# Patient Record
Sex: FEMALE
[De-identification: names, ages, dates, MRNs, and addresses within clinical notes are randomized per-mention and may not be internally consistent; named-entity substitution may affect disease eponyms.]

---

## 2021-01-17 ENCOUNTER — NURSE TRIAGE (OUTPATIENT)
Dept: OTHER | Facility: CLINIC | Age: 71
End: 2021-01-17

## 2021-01-17 NOTE — TELEPHONE ENCOUNTER
Brief description of triage: Pt calls in reporting that she had chest pains on Friday that came and went for a few seconds x several hours. This pain has not re-occurred. Pt reports she has a history of anxiety. See below assessment. Triage indicates for patient to see PCP within 24 hours and pt agreeable with plan. Pt inquires if it is okay to take her PRN Xanax. Pt advised to take Xanax as rx, for acute anxiety, and pt agreeable. Care advice provided, patient verbalizes understanding; denies any other questions or concerns; instructed to call back for any new or worsening symptoms. This triage is a result of a call to [address] [place] [address]. Please do not respond to the triage nurse through this encounter. Any subsequent communication should be directly with the patient. Reason for Disposition   [1] Chest pain lasting < 5 minutes AND [2] NO chest pain or cardiac symptoms (e.g., breathing difficulty, sweating) now    Answer Assessment - Initial Assessment Questions  1. LOCATION: \"Where does it hurt? \"        \"behind\" left breast     2. RADIATION: \"Does the pain go anywhere else? \" (e.g., into neck, jaw, arms, back)      Denies    3. ONSET: \"When did the chest pain begin? \" (Minutes, hours or days)       Pt has these fleeting chest pains on Friday that lasted a few hours and has not returned    4. PATTERN \"Does the pain come and go, or has it been constant since it started? \"  \"Does it get worse with exertion? \"       Came and went    5. DURATION: \"How long does it last\" (e.g., seconds, minutes, hours)      A second or so     6. SEVERITY: \"How bad is the pain? \"  (e.g., Scale 1-10; mild, moderate, or severe)     - MILD (1-3): doesn't interfere with normal activities      - MODERATE (4-7): interferes with normal activities or awakens from sleep     - SEVERE (8-10): excruciating pain, unable to do any normal activities        Mild    7.  CARDIAC RISK FACTORS: \"Do you have any history of heart problems or risk factors for heart disease? \" (e.g., angina, prior heart attack; diabetes, high blood pressure, high cholesterol, smoker, or strong family history of heart disease)      Denies - pt had negative heart cath 6-7 years ago. Pt has controlled HTN. 8. PULMONARY RISK FACTORS: \"Do you have any history of lung disease? \"  (e.g., blood clots in lung, asthma, emphysema, birth control pills)      Denies    9. CAUSE: \"What do you think is causing the chest pain? \"      Anxiety    10. OTHER SYMPTOMS: \"Do you have any other symptoms? \" (e.g., dizziness, nausea, vomiting, sweating, fever, difficulty breathing, cough)        Denies all listed    11. PREGNANCY: \"Is there any chance you are pregnant? \" \"When was your last menstrual period? \"        Not asked    Pt reports that she has been dx with anxiety in the past and has tried two different anti-anxiety medications but they had side effects. Pt has PRN Xanax for acute anxiety and pt has only taken for flying.     Protocols used: CHEST PAIN-ADULT-AH

## 2023-02-08 PROBLEM — M79.10 MYALGIA: Status: ACTIVE | Noted: 2023-02-08

## 2023-02-08 PROBLEM — R39.9 UTI SYMPTOMS: Status: ACTIVE | Noted: 2023-02-08

## 2023-02-08 PROBLEM — J30.2 SEASONAL ALLERGIES: Status: ACTIVE | Noted: 2023-02-08

## 2023-02-08 PROBLEM — F41.9 ANXIETY: Status: ACTIVE | Noted: 2023-02-08

## 2023-02-08 PROBLEM — E66.811 OBESITY (BMI 30.0-34.9): Status: ACTIVE | Noted: 2023-02-08

## 2023-02-08 PROBLEM — Z93.2 ILEOSTOMY PRESENT (MULTI): Status: ACTIVE | Noted: 2023-02-08

## 2023-02-08 PROBLEM — R79.89 ELEVATED TSH: Status: ACTIVE | Noted: 2023-02-08

## 2023-02-08 PROBLEM — F40.243 FEAR OF FLYING: Status: ACTIVE | Noted: 2023-02-08

## 2023-02-08 PROBLEM — G89.29 CHRONIC PAIN OF BOTH SHOULDERS: Status: ACTIVE | Noted: 2023-02-08

## 2023-02-08 PROBLEM — D13.6 SEROUS CYSTADENOMA OF PANCREAS: Status: ACTIVE | Noted: 2023-02-08

## 2023-02-08 PROBLEM — M79.602 PAIN IN LATERAL LEFT UPPER EXTREMITY: Status: ACTIVE | Noted: 2023-02-08

## 2023-02-08 PROBLEM — M25.512 CHRONIC PAIN OF BOTH SHOULDERS: Status: ACTIVE | Noted: 2023-02-08

## 2023-02-08 PROBLEM — R10.9 ABDOMINAL PAIN: Status: ACTIVE | Noted: 2023-02-08

## 2023-02-08 PROBLEM — R07.89 LEFT-SIDED CHEST WALL PAIN: Status: ACTIVE | Noted: 2023-02-08

## 2023-02-08 PROBLEM — M17.12 ARTHRITIS OF KNEE, LEFT: Status: ACTIVE | Noted: 2023-02-08

## 2023-02-08 PROBLEM — E78.00 PURE HYPERCHOLESTEROLEMIA: Status: ACTIVE | Noted: 2023-02-08

## 2023-02-08 PROBLEM — I10 BENIGN ESSENTIAL HYPERTENSION: Status: ACTIVE | Noted: 2023-02-08

## 2023-02-08 PROBLEM — F32.A DEPRESSION: Status: ACTIVE | Noted: 2023-02-08

## 2023-02-08 PROBLEM — R10.9 FLANK PAIN: Status: ACTIVE | Noted: 2023-02-08

## 2023-02-08 PROBLEM — R29.3 POOR POSTURE: Status: ACTIVE | Noted: 2023-02-08

## 2023-02-08 PROBLEM — E66.9 OBESITY (BMI 30.0-34.9): Status: ACTIVE | Noted: 2023-02-08

## 2023-02-08 PROBLEM — M54.31 SCIATICA OF RIGHT SIDE: Status: ACTIVE | Noted: 2023-02-08

## 2023-02-08 PROBLEM — R31.9 HEMATURIA: Status: ACTIVE | Noted: 2023-02-08

## 2023-02-08 PROBLEM — R07.9 INTERMITTENT CHEST PAIN: Status: ACTIVE | Noted: 2023-02-08

## 2023-02-08 PROBLEM — M85.80 OSTEOPENIA: Status: ACTIVE | Noted: 2023-02-08

## 2023-02-08 PROBLEM — R19.8 GASTROINTESTINAL PROBLEM: Status: ACTIVE | Noted: 2023-02-08

## 2023-02-08 PROBLEM — M25.511 CHRONIC PAIN OF BOTH SHOULDERS: Status: ACTIVE | Noted: 2023-02-08

## 2023-02-08 PROBLEM — R07.89 LOCALIZED CHEST PAIN: Status: ACTIVE | Noted: 2023-02-08

## 2023-02-08 PROBLEM — E55.9 VITAMIN D INSUFFICIENCY: Status: ACTIVE | Noted: 2023-02-08

## 2023-02-08 PROBLEM — K21.9 ESOPHAGEAL REFLUX: Status: ACTIVE | Noted: 2023-02-08

## 2023-02-08 PROBLEM — K86.2 CYST OF PANCREAS (HHS-HCC): Status: ACTIVE | Noted: 2023-02-08

## 2023-02-08 RX ORDER — LOSARTAN POTASSIUM 50 MG/1
1 TABLET ORAL DAILY
COMMUNITY
Start: 2020-12-20

## 2023-02-08 RX ORDER — AZELASTINE 1 MG/ML
2 SPRAY, METERED NASAL DAILY
COMMUNITY
Start: 2018-12-04

## 2023-02-08 RX ORDER — NEOMYCIN/POLYMYXIN B/PRAMOXINE 3.5-10K-1
CREAM (GRAM) TOPICAL
COMMUNITY
Start: 2022-03-22

## 2023-02-08 RX ORDER — PANTOPRAZOLE SODIUM 20 MG/1
1 TABLET, DELAYED RELEASE ORAL DAILY
COMMUNITY
Start: 2019-10-09 | End: 2023-03-20

## 2023-02-08 RX ORDER — FLUOXETINE 10 MG/1
1 CAPSULE ORAL DAILY
COMMUNITY
Start: 2022-05-04

## 2023-02-08 RX ORDER — PITAVASTATIN CALCIUM 2.09 MG/1
1 TABLET, FILM COATED ORAL NIGHTLY
COMMUNITY
End: 2024-05-24 | Stop reason: WASHOUT

## 2023-03-16 DIAGNOSIS — K21.9 GASTRO-ESOPHAGEAL REFLUX DISEASE WITHOUT ESOPHAGITIS: ICD-10-CM

## 2023-03-20 RX ORDER — PANTOPRAZOLE SODIUM 20 MG/1
TABLET, DELAYED RELEASE ORAL
Qty: 90 TABLET | Refills: 3 | Status: SHIPPED | OUTPATIENT
Start: 2023-03-20

## 2023-03-22 ENCOUNTER — CLINICAL SUPPORT (OUTPATIENT)
Dept: PRIMARY CARE | Facility: CLINIC | Age: 73
End: 2023-03-22
Payer: MEDICARE

## 2023-03-22 DIAGNOSIS — E55.9 VITAMIN D INSUFFICIENCY: ICD-10-CM

## 2023-03-22 DIAGNOSIS — E66.9 OBESITY (BMI 30.0-34.9): ICD-10-CM

## 2023-03-22 DIAGNOSIS — K20.0 PROTON PUMP INHIBITOR-RESPONSIVE EOSINOPHILIC ESOPHAGITIS: ICD-10-CM

## 2023-03-22 DIAGNOSIS — E78.00 PURE HYPERCHOLESTEROLEMIA: ICD-10-CM

## 2023-03-22 DIAGNOSIS — R79.89 ELEVATED TSH: ICD-10-CM

## 2023-03-22 DIAGNOSIS — I10 BENIGN ESSENTIAL HYPERTENSION: ICD-10-CM

## 2023-03-22 LAB
ALANINE AMINOTRANSFERASE (SGPT) (U/L) IN SER/PLAS: 16 U/L (ref 7–45)
ALBUMIN (G/DL) IN SER/PLAS: 4.1 G/DL (ref 3.4–5)
ALKALINE PHOSPHATASE (U/L) IN SER/PLAS: 64 U/L (ref 33–136)
ANION GAP IN SER/PLAS: 19 MMOL/L (ref 10–20)
ASPARTATE AMINOTRANSFERASE (SGOT) (U/L) IN SER/PLAS: 22 U/L (ref 9–39)
BILIRUBIN TOTAL (MG/DL) IN SER/PLAS: 0.9 MG/DL (ref 0–1.2)
CALCIUM (MG/DL) IN SER/PLAS: 9.8 MG/DL (ref 8.6–10.6)
CARBON DIOXIDE, TOTAL (MMOL/L) IN SER/PLAS: 24 MMOL/L (ref 21–32)
CHLORIDE (MMOL/L) IN SER/PLAS: 100 MMOL/L (ref 98–107)
CHOLESTEROL (MG/DL) IN SER/PLAS: 189 MG/DL (ref 0–199)
CHOLESTEROL IN HDL (MG/DL) IN SER/PLAS: 70.6 MG/DL
CHOLESTEROL/HDL RATIO: 2.7
CREATININE (MG/DL) IN SER/PLAS: 0.57 MG/DL (ref 0.5–1.05)
ERYTHROCYTE DISTRIBUTION WIDTH (RATIO) BY AUTOMATED COUNT: 13.6 % (ref 11.5–14.5)
ERYTHROCYTE MEAN CORPUSCULAR HEMOGLOBIN CONCENTRATION (G/DL) BY AUTOMATED: 30.6 G/DL (ref 32–36)
ERYTHROCYTE MEAN CORPUSCULAR VOLUME (FL) BY AUTOMATED COUNT: 95 FL (ref 80–100)
ERYTHROCYTES (10*6/UL) IN BLOOD BY AUTOMATED COUNT: 4.56 X10E12/L (ref 4–5.2)
GFR FEMALE: >90 ML/MIN/1.73M2
GLUCOSE (MG/DL) IN SER/PLAS: 79 MG/DL (ref 74–99)
HEMATOCRIT (%) IN BLOOD BY AUTOMATED COUNT: 43.1 % (ref 36–46)
HEMOGLOBIN (G/DL) IN BLOOD: 13.2 G/DL (ref 12–16)
LDL: 96 MG/DL (ref 0–99)
LEUKOCYTES (10*3/UL) IN BLOOD BY AUTOMATED COUNT: 7 X10E9/L (ref 4.4–11.3)
MAGNESIUM (MG/DL) IN SER/PLAS: 1.89 MG/DL (ref 1.6–2.4)
NRBC (PER 100 WBCS) BY AUTOMATED COUNT: 0 /100 WBC (ref 0–0)
PLATELETS (10*3/UL) IN BLOOD AUTOMATED COUNT: 259 X10E9/L (ref 150–450)
POTASSIUM (MMOL/L) IN SER/PLAS: 4.3 MMOL/L (ref 3.5–5.3)
PROTEIN TOTAL: 7.1 G/DL (ref 6.4–8.2)
SODIUM (MMOL/L) IN SER/PLAS: 139 MMOL/L (ref 136–145)
TRIGLYCERIDE (MG/DL) IN SER/PLAS: 111 MG/DL (ref 0–149)
UREA NITROGEN (MG/DL) IN SER/PLAS: 8 MG/DL (ref 6–23)
VLDL: 22 MG/DL (ref 0–40)

## 2023-03-22 PROCEDURE — 85027 COMPLETE CBC AUTOMATED: CPT

## 2023-03-22 PROCEDURE — 82306 VITAMIN D 25 HYDROXY: CPT

## 2023-03-22 PROCEDURE — 80061 LIPID PANEL: CPT

## 2023-03-22 PROCEDURE — 83735 ASSAY OF MAGNESIUM: CPT

## 2023-03-22 PROCEDURE — 36415 COLL VENOUS BLD VENIPUNCTURE: CPT | Performed by: NURSE PRACTITIONER

## 2023-03-22 PROCEDURE — 80053 COMPREHEN METABOLIC PANEL: CPT

## 2023-03-22 RX ORDER — ASPIRIN 81 MG/1
1 TABLET ORAL DAILY
COMMUNITY
End: 2023-03-23 | Stop reason: SINTOL

## 2023-03-22 RX ORDER — TRIAMCINOLONE ACETONIDE 1 MG/G
1 CREAM TOPICAL 2 TIMES DAILY
COMMUNITY
Start: 2022-06-06 | End: 2023-03-23 | Stop reason: ALTCHOICE

## 2023-03-22 RX ORDER — PRAVASTATIN SODIUM 10 MG/1
1 TABLET ORAL NIGHTLY
COMMUNITY
End: 2023-03-23 | Stop reason: ALTCHOICE

## 2023-03-23 ENCOUNTER — OFFICE VISIT (OUTPATIENT)
Dept: PRIMARY CARE | Facility: CLINIC | Age: 73
End: 2023-03-23
Payer: MEDICARE

## 2023-03-23 VITALS
HEIGHT: 59 IN | SYSTOLIC BLOOD PRESSURE: 161 MMHG | OXYGEN SATURATION: 93 % | TEMPERATURE: 97.3 F | WEIGHT: 150 LBS | DIASTOLIC BLOOD PRESSURE: 79 MMHG | BODY MASS INDEX: 30.24 KG/M2 | RESPIRATION RATE: 16 BRPM | HEART RATE: 72 BPM

## 2023-03-23 DIAGNOSIS — F32.A DEPRESSION, UNSPECIFIED DEPRESSION TYPE: ICD-10-CM

## 2023-03-23 DIAGNOSIS — Z00.00 MEDICARE ANNUAL WELLNESS VISIT, SUBSEQUENT: Primary | ICD-10-CM

## 2023-03-23 DIAGNOSIS — I10 BENIGN ESSENTIAL HYPERTENSION: ICD-10-CM

## 2023-03-23 DIAGNOSIS — K21.9 GASTROESOPHAGEAL REFLUX DISEASE WITHOUT ESOPHAGITIS: ICD-10-CM

## 2023-03-23 DIAGNOSIS — M85.80 OSTEOPENIA, UNSPECIFIED LOCATION: ICD-10-CM

## 2023-03-23 DIAGNOSIS — E78.00 PURE HYPERCHOLESTEROLEMIA: ICD-10-CM

## 2023-03-23 DIAGNOSIS — Z87.891 FORMER SMOKER: ICD-10-CM

## 2023-03-23 DIAGNOSIS — M17.12 ARTHRITIS OF KNEE, LEFT: ICD-10-CM

## 2023-03-23 DIAGNOSIS — E66.9 OBESITY, CLASS I, BMI 30.0-34.9 (SEE ACTUAL BMI): ICD-10-CM

## 2023-03-23 DIAGNOSIS — F41.9 ANXIETY: ICD-10-CM

## 2023-03-23 PROBLEM — E66.811 OBESITY, CLASS I, BMI 30.0-34.9 (SEE ACTUAL BMI): Status: ACTIVE | Noted: 2023-03-23

## 2023-03-23 PROBLEM — E66.811 OBESITY (BMI 30.0-34.9): Status: RESOLVED | Noted: 2023-02-08 | Resolved: 2023-03-23

## 2023-03-23 LAB — CALCIDIOL (25 OH VITAMIN D3) (NG/ML) IN SER/PLAS: 55 NG/ML

## 2023-03-23 PROCEDURE — 99214 OFFICE O/P EST MOD 30 MIN: CPT | Performed by: NURSE PRACTITIONER

## 2023-03-23 PROCEDURE — G0439 PPPS, SUBSEQ VISIT: HCPCS | Performed by: NURSE PRACTITIONER

## 2023-03-23 PROCEDURE — 3078F DIAST BP <80 MM HG: CPT | Performed by: NURSE PRACTITIONER

## 2023-03-23 PROCEDURE — 1036F TOBACCO NON-USER: CPT | Performed by: NURSE PRACTITIONER

## 2023-03-23 PROCEDURE — 1170F FXNL STATUS ASSESSED: CPT | Performed by: NURSE PRACTITIONER

## 2023-03-23 PROCEDURE — 3077F SYST BP >= 140 MM HG: CPT | Performed by: NURSE PRACTITIONER

## 2023-03-23 PROCEDURE — 1159F MED LIST DOCD IN RCRD: CPT | Performed by: NURSE PRACTITIONER

## 2023-03-23 RX ORDER — BUPROPION HYDROCHLORIDE 150 MG/1
150 TABLET ORAL EVERY MORNING
COMMUNITY
End: 2023-05-19 | Stop reason: ALTCHOICE

## 2023-03-23 RX ORDER — TIMOLOL 5 MG/ML
1 SOLUTION/ DROPS OPHTHALMIC 2 TIMES DAILY
COMMUNITY
End: 2023-05-19 | Stop reason: ALTCHOICE

## 2023-03-23 ASSESSMENT — ENCOUNTER SYMPTOMS
POLYDIPSIA: 0
ABDOMINAL PAIN: 0
DYSURIA: 0
HALLUCINATIONS: 0
EYE PAIN: 0
WEAKNESS: 0
HEADACHES: 0
PALPITATIONS: 0
SHORTNESS OF BREATH: 0
RHINORRHEA: 0
FEVER: 0
CHILLS: 0
ADENOPATHY: 0
HEMATURIA: 0
ARTHRALGIAS: 1
DEPRESSION: 0
LOSS OF SENSATION IN FEET: 0
EYE REDNESS: 0
SORE THROAT: 0
WOUND: 0
BACK PAIN: 0
NECK STIFFNESS: 0
BLOOD IN STOOL: 0
BRUISES/BLEEDS EASILY: 0
FATIGUE: 0
COUGH: 0
OCCASIONAL FEELINGS OF UNSTEADINESS: 0

## 2023-03-23 ASSESSMENT — PATIENT HEALTH QUESTIONNAIRE - PHQ9
SUM OF ALL RESPONSES TO PHQ9 QUESTIONS 1 AND 2: 0
2. FEELING DOWN, DEPRESSED OR HOPELESS: NOT AT ALL
1. LITTLE INTEREST OR PLEASURE IN DOING THINGS: NOT AT ALL

## 2023-03-23 ASSESSMENT — ACTIVITIES OF DAILY LIVING (ADL)
GROCERY_SHOPPING: INDEPENDENT
TAKING_MEDICATION: INDEPENDENT
MANAGING_FINANCES: INDEPENDENT
DRESSING: INDEPENDENT
DOING_HOUSEWORK: INDEPENDENT
BATHING: INDEPENDENT

## 2023-03-23 NOTE — ASSESSMENT & PLAN NOTE
BP slightly elevated in the office today 141/79, asymptomatic, patient does endorse whitecoat syndrome and reports her blood pressures at home do not go over 140, continue losartan 50 mg daily, BMP reviewed with patient

## 2023-03-23 NOTE — ASSESSMENT & PLAN NOTE
Completed 3/23/2023, exam benign with BMI 30, patient is a non-smoker, up-to-date on vaccinations, lives alone, no recent falls, no loss of ADLs, depression controlled, reviewed blood work with patient, patient to follow-up with GYN for mammogram and DEXA orders

## 2023-03-23 NOTE — PROGRESS NOTES
"Subjective   Reason for Visit: Carmella Shin is an 73 y.o. female here for a Medicare Wellness visit.          Reviewed all medications by prescribing practitioner or clinical pharmacist (such as prescriptions, OTCs, herbal therapies and supplements) and documented in the medical record.    HPI 73-year-old female here for [facility].  She reports she is taking her medication as prescribed, denies any side effects.  She does report that she is having a more difficult time going up and down the stairs as a pertains to her arthritis in her left knee.  She denies any significant pain in the knee, reports that sometimes it will be a little painful down the shin but it is not very bothersome.  She denies any other acute issues or concerns at this time.    Patient Care Team:  MARGUERITE Allen-CNP as PCP - General     Review of Systems   Constitutional:  Negative for chills, fatigue and fever.   HENT:  Negative for rhinorrhea and sore throat.    Eyes:  Negative for pain and redness.   Respiratory:  Negative for cough and shortness of breath.    Cardiovascular:  Negative for chest pain and palpitations.   Gastrointestinal:  Negative for abdominal pain and blood in stool.   Endocrine: Negative for polydipsia and polyuria.   Genitourinary:  Negative for dysuria and hematuria.   Musculoskeletal:  Positive for arthralgias. Negative for back pain and neck stiffness.   Skin:  Negative for rash and wound.   Allergic/Immunologic: Negative for environmental allergies and food allergies.   Neurological:  Negative for weakness and headaches.   Hematological:  Negative for adenopathy. Does not bruise/bleed easily.   Psychiatric/Behavioral:  Negative for hallucinations and suicidal ideas.        Objective   Vitals:  /79 (BP Location: Left arm, Patient Position: Sitting, BP Cuff Size: Adult)   Pulse 72   Temp 36.3 °C (97.3 °F) (Temporal)   Resp 16   Ht 1.499 m (4' 11\")   Wt 68 kg (150 lb)   SpO2 93%   BMI 30.30 kg/m²   "     Physical Exam  Vitals reviewed.   Constitutional:       General: She is not in acute distress.     Appearance: She is not ill-appearing.   HENT:      Head: Normocephalic and atraumatic.      Right Ear: Tympanic membrane normal.      Left Ear: Tympanic membrane normal.      Nose: No congestion or rhinorrhea.      Mouth/Throat:      Pharynx: No oropharyngeal exudate or posterior oropharyngeal erythema.   Eyes:      Extraocular Movements: Extraocular movements intact.      Conjunctiva/sclera: Conjunctivae normal.      Pupils: Pupils are equal, round, and reactive to light.   Cardiovascular:      Rate and Rhythm: Normal rate and regular rhythm.      Heart sounds: No murmur heard.     No friction rub. No gallop.   Pulmonary:      Effort: Pulmonary effort is normal.      Breath sounds: Normal breath sounds. No wheezing, rhonchi or rales.   Abdominal:      General: There is no distension.      Palpations: Abdomen is soft.      Tenderness: There is no abdominal tenderness. There is no guarding or rebound.   Musculoskeletal:         General: No swelling or deformity.      Cervical back: Normal range of motion and neck supple.      Right lower leg: No edema.      Left lower leg: No edema.   Skin:     Capillary Refill: Capillary refill takes less than 2 seconds.      Coloration: Skin is not jaundiced.      Findings: No rash.   Neurological:      General: No focal deficit present.      Mental Status: She is alert.      Motor: No weakness.   Psychiatric:         Mood and Affect: Mood normal.         Behavior: Behavior normal.         Assessment/Plan   Problem List Items Addressed This Visit          Circulatory    Benign essential hypertension    Current Assessment & Plan     BP slightly elevated in the office today 141/79, asymptomatic, patient does endorse whitecoat syndrome and reports her blood pressures at home do not go over 140, continue losartan 50 mg daily, BMP reviewed with patient            Digestive     Esophageal reflux    Current Assessment & Plan     Well-controlled on pantoprazole 20 mg daily, magnesium level reviewed and normal            Musculoskeletal    Arthritis of knee, left    Relevant Orders    Referral to Physical Therapy    Osteopenia    Current Assessment & Plan     On calcium plus D, patient to follow-up with GYN for DEXA scan this summer            Endocrine/Metabolic    Obesity, Class I, BMI 30.0-34.9 (see actual BMI)    Current Assessment & Plan     Discussed the importance of healthy well-rounded diet, patient exercises daily, TSH within normal            Other    Anxiety    Current Assessment & Plan     Patient reports it is well controlled on fluoxetine 10 mg daily         Depression    Current Assessment & Plan     Well-controlled on Wellbutrin 150 mg daily         Pure hypercholesterolemia    Current Assessment & Plan     Lipid panel reviewed with patient and results within normal ranges, continue Livalo 2 mg nightly         Medicare annual wellness visit, subsequent - Primary    Current Assessment & Plan     Completed 3/23/2023, exam benign with BMI 30, patient is a non-smoker, up-to-date on vaccinations, lives alone, no recent falls, no loss of ADLs, depression controlled, reviewed blood work with patient, patient to follow-up with GYN for mammogram and DEXA orders          Other Visit Diagnoses       Former smoker

## 2023-05-11 ENCOUNTER — TELEPHONE (OUTPATIENT)
Dept: PRIMARY CARE | Facility: CLINIC | Age: 73
End: 2023-05-11
Payer: MEDICARE

## 2023-05-11 NOTE — TELEPHONE ENCOUNTER
Transition of Care    Inpatient facility: [facility]  Discharge diagnosis: SBO, Hypokalemia  Discharged to: Home  Discharge date: 05/10/2023  Initial Call date: 05/11/2023  Spoke with patient/caregiver: Patient on 05/11/2023 at 2:45pm   Left Message on date : 05/11/2023 at 2:30pm                                                                    Do you need assistance  visits prior to your PCP visit: No  Home health care ordered: No  Have you been contacted by home care and have a start of care date: No  Are you taking medications as prescribed at discharge: Yes  If not taking medication as prescribed refer to [facility] Pharmacist:    Referral to [facility] Pharmacist: No  Patient advised to bring all medications to PCP follow-up appointment.  Patient advised to follow discharge instructions until provider follow-up.  TCM visit date: 05/19/2023  TCM provider visit with: Dr. Carlson

## 2023-05-18 NOTE — PROGRESS NOTES
Subjective   Patient ID: Carmella Shin is a 73 y.o. female who presents for Hospital Follow-up (Small bowl obstruction/).    Feeling better as soon as Robles relieved pressure through stoma.     Curious about hiatal hernia and para-stomal hernia found on CT scan.     TCM communication documented 5/11/23  TCM today <14 days   DC summary reviewed.     Objective   Visit Vitals  /75 (BP Location: Left arm, Patient Position: Sitting, BP Cuff Size: Adult)   Pulse 77   Temp 36.5 °C (97.7 °F) (Temporal)   Resp 16   SpO2 94%   Smoking Status Former      O: VSS AFEB Awake, Alert, NAD.  No intoxication, withdrawal, or sedation.  Chest CTA.  Heart RRR.  Ext no c/c/e.   Stoma intact.  Abd nt/nd.     Lab Results   Component Value Date    WBC 6.2 05/10/2023    HGB 11.7 (L) 05/10/2023    HCT 37.4 05/10/2023    MCV 91 05/10/2023     05/10/2023       Chemistry    Lab Results   Component Value Date/Time     05/19/2023 1657    K 3.8 05/19/2023 1657     05/19/2023 1657    CO2 30 05/19/2023 1657    BUN 8 05/19/2023 1657    CREATININE 0.67 05/19/2023 1657    Lab Results   Component Value Date/Time    CALCIUM 9.3 05/19/2023 1657    ALKPHOS 51 05/10/2023 0751    AST 13 05/10/2023 0751    ALT 11 05/10/2023 0751    BILITOT 0.7 05/10/2023 0751          Lab Results   Component Value Date    CHOL 189 03/22/2023    CHOL 158 03/22/2022    CHOL 178 02/12/2021     Lab Results   Component Value Date    HDL 70.6 03/22/2023    HDL 60.4 03/22/2022    HDL 68.9 02/12/2021     No results found for: LDLCALC  Lab Results   Component Value Date    TRIG 111 03/22/2023    TRIG 103 03/22/2022    TRIG 134 02/12/2021     No components found for: CHOLHDL   No results found for: HGBA1C    Assessment/Plan   Problem List Items Addressed This Visit          Digestive    Ileostomy present (CMS/HCC)    Overview     Hx rectal cancer s/p resection.     10/24-10/25/19 [facility] -- SBO resolved with bowel rest.   5/6-5/10/2023 [facility] -- SBO resolved with  ANA and JOSHUA to stoma per Dr Rollins.          Current Assessment & Plan     Refer to Dr Rollins for continued surgical care.             Other    Pure hypercholesterolemia    Relevant Medications    rosuvastatin (Crestor) 5 mg tablet     Other Visit Diagnoses       Hypokalemia    -  Primary    monitor labs    Relevant Orders    Basic Metabolic Panel (Completed)

## 2023-05-19 ENCOUNTER — OFFICE VISIT (OUTPATIENT)
Dept: PRIMARY CARE | Facility: CLINIC | Age: 73
End: 2023-05-19
Payer: MEDICARE

## 2023-05-19 VITALS
SYSTOLIC BLOOD PRESSURE: 135 MMHG | OXYGEN SATURATION: 94 % | HEART RATE: 77 BPM | DIASTOLIC BLOOD PRESSURE: 75 MMHG | TEMPERATURE: 97.7 F | RESPIRATION RATE: 16 BRPM

## 2023-05-19 DIAGNOSIS — E87.6 HYPOKALEMIA: Primary | ICD-10-CM

## 2023-05-19 DIAGNOSIS — E78.00 PURE HYPERCHOLESTEROLEMIA: ICD-10-CM

## 2023-05-19 DIAGNOSIS — Z93.2 ILEOSTOMY PRESENT (MULTI): ICD-10-CM

## 2023-05-19 LAB
ANION GAP IN SER/PLAS: 12 MMOL/L (ref 10–20)
CALCIUM (MG/DL) IN SER/PLAS: 9.3 MG/DL (ref 8.6–10.3)
CARBON DIOXIDE, TOTAL (MMOL/L) IN SER/PLAS: 30 MMOL/L (ref 21–32)
CHLORIDE (MMOL/L) IN SER/PLAS: 100 MMOL/L (ref 98–107)
CREATININE (MG/DL) IN SER/PLAS: 0.67 MG/DL (ref 0.5–1.05)
GFR FEMALE: >90 ML/MIN/1.73M2
GLUCOSE (MG/DL) IN SER/PLAS: 87 MG/DL (ref 74–99)
POTASSIUM (MMOL/L) IN SER/PLAS: 3.8 MMOL/L (ref 3.5–5.3)
SODIUM (MMOL/L) IN SER/PLAS: 138 MMOL/L (ref 136–145)
UREA NITROGEN (MG/DL) IN SER/PLAS: 8 MG/DL (ref 6–23)

## 2023-05-19 PROCEDURE — 80048 BASIC METABOLIC PNL TOTAL CA: CPT

## 2023-05-19 PROCEDURE — 3075F SYST BP GE 130 - 139MM HG: CPT | Performed by: FAMILY MEDICINE

## 2023-05-19 PROCEDURE — 1159F MED LIST DOCD IN RCRD: CPT | Performed by: FAMILY MEDICINE

## 2023-05-19 PROCEDURE — 99495 TRANSJ CARE MGMT MOD F2F 14D: CPT | Performed by: FAMILY MEDICINE

## 2023-05-19 PROCEDURE — 1036F TOBACCO NON-USER: CPT | Performed by: FAMILY MEDICINE

## 2023-05-19 PROCEDURE — 3078F DIAST BP <80 MM HG: CPT | Performed by: FAMILY MEDICINE

## 2023-05-19 RX ORDER — ROSUVASTATIN CALCIUM 5 MG/1
5 TABLET, COATED ORAL DAILY
Qty: 90 TABLET | Refills: 3 | Status: SHIPPED | OUTPATIENT
Start: 2023-05-19 | End: 2024-05-18

## 2023-05-19 RX ORDER — PITAVASTATIN CALCIUM 2.09 MG/1
1 TABLET, FILM COATED ORAL DAILY
COMMUNITY
End: 2024-05-24 | Stop reason: WASHOUT

## 2023-05-20 PROBLEM — R19.8 GASTROINTESTINAL PROBLEM: Status: RESOLVED | Noted: 2023-02-08 | Resolved: 2023-05-20

## 2023-05-20 PROBLEM — R39.9 UTI SYMPTOMS: Status: RESOLVED | Noted: 2023-02-08 | Resolved: 2023-05-20

## 2023-05-20 PROBLEM — D32.9 MENINGIOMA (MULTI): Status: ACTIVE | Noted: 2023-05-20

## 2023-05-20 NOTE — ASSESSMENT & PLAN NOTE
Follow up with Dr Rollins for concerns of para-stomal hernia.  Reassured about mild nature of hiatal hernia -- is not interested in Nissen repair.

## 2023-05-20 NOTE — ASSESSMENT & PLAN NOTE
>>ASSESSMENT AND PLAN FOR CYST OF PANCREAS WRITTEN ON 5/20/2023  3:34 PM BY CONNIE BAINS MD    Continue to follow with GI.

## 2023-11-08 ENCOUNTER — LAB (OUTPATIENT)
Dept: LAB | Facility: LAB | Age: 73
End: 2023-11-08
Payer: MEDICARE

## 2023-11-08 DIAGNOSIS — E78.00 PURE HYPERCHOLESTEROLEMIA, UNSPECIFIED: Primary | ICD-10-CM

## 2023-11-08 LAB
CHOLEST SERPL-MCNC: 199 MG/DL (ref 0–199)
CHOLESTEROL/HDL RATIO: 3.1
HDLC SERPL-MCNC: 65.2 MG/DL
LDLC SERPL CALC-MCNC: 110 MG/DL
NON HDL CHOLESTEROL: 134 MG/DL (ref 0–149)
TRIGL SERPL-MCNC: 117 MG/DL (ref 0–149)
VLDL: 23 MG/DL (ref 0–40)

## 2023-11-08 PROCEDURE — 80061 LIPID PANEL: CPT

## 2023-11-08 PROCEDURE — 36415 COLL VENOUS BLD VENIPUNCTURE: CPT

## 2023-11-15 ENCOUNTER — LAB (OUTPATIENT)
Dept: LAB | Facility: LAB | Age: 73
End: 2023-11-15
Payer: MEDICARE

## 2023-11-15 DIAGNOSIS — Z13.29 ENCOUNTER FOR SCREENING FOR OTHER SUSPECTED ENDOCRINE DISORDER: Primary | ICD-10-CM

## 2023-11-15 LAB
EST. AVERAGE GLUCOSE BLD GHB EST-MCNC: 114 MG/DL
HBA1C MFR BLD: 5.6 %

## 2023-11-15 PROCEDURE — 83036 HEMOGLOBIN GLYCOSYLATED A1C: CPT

## 2023-11-15 PROCEDURE — 36415 COLL VENOUS BLD VENIPUNCTURE: CPT

## 2024-04-16 DIAGNOSIS — D13.6 SEROUS CYSTADENOMA OF PANCREAS: Primary | ICD-10-CM

## 2024-05-17 ENCOUNTER — HOSPITAL ENCOUNTER (OUTPATIENT)
Dept: RADIOLOGY | Facility: HOSPITAL | Age: 74
Discharge: HOME | End: 2024-05-17
Payer: MEDICARE

## 2024-05-17 DIAGNOSIS — D13.6 SEROUS CYSTADENOMA OF PANCREAS: ICD-10-CM

## 2024-05-17 PROCEDURE — 74183 MRI ABD W/O CNTR FLWD CNTR: CPT

## 2024-05-17 PROCEDURE — A9575 INJ GADOTERATE MEGLUMI 0.1ML: HCPCS | Performed by: PHYSICIAN ASSISTANT

## 2024-05-17 PROCEDURE — 2500000004 HC RX 250 GENERAL PHARMACY W/ HCPCS (ALT 636 FOR OP/ED): Performed by: PHYSICIAN ASSISTANT

## 2024-05-17 RX ORDER — GADOTERATE MEGLUMINE 376.9 MG/ML
0.2 INJECTION INTRAVENOUS
Status: COMPLETED | OUTPATIENT
Start: 2024-05-17 | End: 2024-05-17

## 2024-05-17 RX ADMIN — GADOTERATE MEGLUMINE 13 ML: 376.9 INJECTION INTRAVENOUS at 08:48

## 2024-05-24 ENCOUNTER — TELEMEDICINE (OUTPATIENT)
Dept: SURGICAL ONCOLOGY | Facility: CLINIC | Age: 74
End: 2024-05-24
Payer: MEDICARE

## 2024-05-24 DIAGNOSIS — D13.6 SEROUS CYSTADENOMA OF PANCREAS: Primary | ICD-10-CM

## 2024-05-24 PROCEDURE — 1157F ADVNC CARE PLAN IN RCRD: CPT | Performed by: PHYSICIAN ASSISTANT

## 2024-05-24 PROCEDURE — 99214 OFFICE O/P EST MOD 30 MIN: CPT | Performed by: PHYSICIAN ASSISTANT

## 2024-05-24 NOTE — PROGRESS NOTES
A virtual visit (audio and visual connection) between the patient (at the originating site) and the provider (at the distant site) was utilized to provide this telehealth service.    Verbal consent was requested and obtained from the patient immediately prior to the telehealth visit.     Subjective   Ms. Shin is a 74-year-old female who is here to discuss surveillance MRCP results for a serous cystadenoma in the tail of the pancreas.     She has known of her pancreatic cyst since at least . It has been slowly enlarging since that time. In 2019, she had an EUS-FNA with Dr. Fernie Rios at [place]. The cytology was reportedly benign and the lesion was felt to be a serous cystadenoma.     Since that time, she has been on surveillance.     Recent MRCP from 24 demonstrates a 5.6 cm x 5.4 cm multicystic mass in the pancreatic body/tail junction with internal septations, previously measuring 4.3 cm x 3.5 cm, with no nodular enhancing components or main pancreatic duct dilatation.     She denies early satiety, poor appetite, unintentional weight loss, abdominal pain or jaundice.    Since our last visit, she was admitted to the hospital with a small bowel obstruction secondary to a peristomal hernia that resolved after intubation of the ostomy with a oleary catheter and finger.      Past medical history: Pancreatic cyst, ulcerative colitis s/p total colectomy with rectal stump, rectal cancer s/p APR with ileostomy, small bowel obstruction, anxiety, HTN, GERD, hypercholesterolemia.   Surgical history: TAC with rectal stump (remote), APR with ileostomy and bilateral salpingo-oophorectomy,  x 2, tonsillectomy.   Family history: No GI malignancies. Mother had brain cancer.   Social history: Non-smoker. No alcohol use. No illicits.     Objective     There were no vitals taken for this visit.     Physical Exam  A physical exam was not conducted as this was a phone or virtual visit. The patient is in no acute  distress.     Assessment/Plan   Ms. Shin is a 74-year-old female who is here to discuss surveillance MRCP results for a serous cystadenoma in the tail of the pancreas.     PLAN: She has a serous cystadenoma in the body/tail junction of the pancreas. This is a benign cyst but has exhibited growth overtime, including over the past 2 years. I showed her the recent images and demonstrated that there are several components of the cyst that have exhibited growth more recently.     I also reviewed her imaging at our multidisciplinary pancreas conference prior to this visit. Our radiologist noted that the maximum dimension of the cyst was 2.3 cm 2009 and is now 5.6 cm. Therefore, the group recommends continued surveillance.    Will continue with every other year surveillance. I asked her to contact me earlier should she develop any alarming symptoms such as persistent abdominal discomfort or early satiety.      Sharyn James PA-C

## 2024-06-06 NOTE — PROGRESS NOTES
Thank you for attending our Joint Replacement class today in preparation for your upcoming surgery.  Topics discussed include:    MyChart Enrollment  Communication with Care Team  My Chart is the best form of communication to reach all of your caregivers  You can send messages to specific care givers, or a care team  Continued Education  You will be enrolled in a Total Joint Replacement care plan to receive additional education before and after surgery  You can review a short recording of the class content  Access to Medical Records  You can access test results, office notes, appointments, etc.  You can connect to other healthcare systems who use [employer] ([facility], [facility], [facility], etc.)  [email or web address]  Program Information  Consent to Enroll    Background/Understanding of Joint Replacement Surgery  Potential for same day discharge  Any questions or concerns to be directed to the surgeon's office    How to Prepare for Surgery  Use of Nicotine Products/Smoking  Stop several weeks before surgery  Such products slow down the healing process and increase risk of post-op infection and complications  Clearance for Surgery  Medical Clearance by Specialists  Dental Clearance  Cracked/Broken/Loose teeth left untreated may postpone surgery  The importance of post-op antibiotics for dental visits per surgeon protocol  Preadmission Testing  **Potential for postponed surgery if appropriate clearance is not obtained  Medication Instruction  Follow instructions provided by the doctor who prescribes your medication (typically, but not limited to cardiologist)  Preadmission testing will provide additional instructions during your appointment on what to stop and what to take as you get closer to surgery  For clarification of these instructions, please call preadmission testing directly - [phone]  Tips for Preparing the home for discharge from the hospital  Care Partner  Requirement for surgery, the patient must have a plan to have  help at home  Potential for postponed surgery if plan for home support cannot be established  How the care partner can help after surgery  CHG Body Wash/Mouth Wash  Follow the instructions given at preadmission testing  Body wash is to be used on the body and hair for 5 washes  Mouthwash is to be used the night before and morning of surgery  **This is a system-wide protocol developed by infectious disease professionals, we will not alter our recommendations for those with sensitive skin or those who have special hair needs.  Please follow the instructions as they are written as this will provide the best infection prevention measures for surgery.  Should you have an allergy to one of the products, please discuss with your preadmission team**    What to Expect in the Hospital/At Home  Morning of Surgery NPO Guidelines  Nothing to eat after midnight  Water can be consumed up to 2 hours prior to arrival  Surgical and Post-Surgical Care Team  Surgical Team  Anesthesia Team  Nursing  Physical Therapy  Care Coordinating  Pharmacy  Hospital Arrival Instructions  Arrive at the time provided to you  Consider traffic patterns (rush-hour) based on arrival time  Have arrangements made for a ride home  If discharging same day, care partner should remain at the hospital  Recovering after Surgery  Recovery Room - Visitors are not brought back  Transition to hospital room - 2nd Floor, Visitors will be directed to your room  The presence of and strategies for controlling surgical pain and swelling  The importance of early mobility  Side effects after surgery  What to expect if staying overnight    Discharge Planning  The intended plan for discharge will be for patients to discharge home  All patients require a care partner (family, friend, neighbor, etc.) to stay with the patient for the first few nights after surgery  The inability to secure help at home will postpone surgery  Home Care Services set up per surgeon order  Physical  Therapy  Occupational Therapy  **If desired, private duty care can be arranged by the patient ahead of time**  Outpatient Physical Therapy per surgeon order    Recovering at Home  Wound Care  Follow wound care instructions found in your discharge paperwork  Bandage is water-resistant and you may shower with the bandage  Do not scrub directly over the bandage  Do not submerge in water until cleared (bathtub, hot tub, pool, etc.)    Post-Op Risk Prevention  Infection Prevention  Promptly seek treatment for any infections post-operatively  Routine dental visits must be postponed for 3 months after surgery  Your surgeon may require antibiotics prior to future dental visits  Any concerns for infection not related directly to the knee or the hip should be managed by your primary care provider  Blood Clots  Be sure to complete the course of blood thinning medication as prescribed by your surgeon  Movement every 1-2 hours during the day is encouraged to prevent blood clots  Monitor for signs of blood clots  Wear compression stockings as prescribed by your surgeon  Constipation  Constipation is common following surgery  Drink plenty of fluids  Take stool softener/laxative as prescribed by your surgeon  Move around frequently  Eat foods high in fiber  Fall Prevention  Prepare home ahead of time to clear space to move with walker  Remove throw rugs and electrical cords from walkways  Install railings near any stairways with more than 2 steps  Use night lights for increased visibility at night  Continue to use your assistive device until cleared by surgeon or physical therapy  Dislocation Prevention - Not all procedures will have dislocation precautions  Follow dislocation precautions provided by your surgeon  It is OK to resume sexual activity about 6 weeks following surgery  Be sure to follow any dislocation precautions assigned    Durable Medical Equipment  Cold Therapy  Breg Cold Therapy Machines  Ice/Gel Packs  Assistive  Devices  Folding Walker with Wheels (in the front only)  No Rollators  Crutches if approved by Physical Therapy and Surgeon after surgery  Hip Kits  Raised Toilet Seats  Additional Compression Stockings    Joint Preservation  Healthy Activities when Cleared  Walking  Swimming  Bike Riding  Activities to Avoid  Refrain from repetitive motions which have a high impact on the joint  Gradual Progression  Progress activity slowly, listen to your body  Common Findings - NORMAL after surgery  Clicking/Grinding  Numbness near incision    Physical Therapy  Prehabilitation exercises  START TODAY ON BOTH LEGS  Surgery Specific Precautions  Follow surgery specific precautions found in your discharge paperwork    Follow-Up Visit  All patients will see their surgeon for a follow up visit after surgery  The visit may range from 2-6 weeks after surgery and is surgeon specific      Please don't hesitate to reach out if you have any additional questions or concerns.    Sravani Tavarez MBA, BSN, RN-BC  MIKAL RappN, RN  [facility]  [facility]   [phone]

## 2024-06-12 ENCOUNTER — HOSPITAL ENCOUNTER (OUTPATIENT)
Dept: RADIOLOGY | Facility: HOSPITAL | Age: 74
Discharge: HOME | End: 2024-06-12
Payer: MEDICARE

## 2024-06-12 ENCOUNTER — EDUCATION (OUTPATIENT)
Dept: ORTHOPEDIC SURGERY | Facility: HOSPITAL | Age: 74
End: 2024-06-12
Payer: MEDICARE

## 2024-06-12 DIAGNOSIS — M25.562 PAIN IN LEFT KNEE: ICD-10-CM

## 2024-06-12 PROCEDURE — 73700 CT LOWER EXTREMITY W/O DYE: CPT | Mod: LT

## 2024-06-12 PROCEDURE — 73700 CT LOWER EXTREMITY W/O DYE: CPT | Mod: LEFT SIDE | Performed by: STUDENT IN AN ORGANIZED HEALTH CARE EDUCATION/TRAINING PROGRAM

## 2024-06-12 ASSESSMENT — KOOS JR
GOING UP OR DOWN STAIRS: SEVERE
RISING FROM SITTING: MODERATE
KOOS JR SCORING: 61.58
BENDING TO THE FLOOR TO PICK UP OBJECT: MILD
STRAIGHTENING KNEE FULLY: MILD
TWISING OR PIVOTING ON KNEE: MODERATE
STANDING UPRIGHT: MILD

## 2024-06-20 ENCOUNTER — PRE-ADMISSION TESTING (OUTPATIENT)
Dept: PREADMISSION TESTING | Facility: HOSPITAL | Age: 74
End: 2024-06-20
Payer: MEDICARE

## 2024-06-20 ENCOUNTER — LAB (OUTPATIENT)
Dept: LAB | Facility: LAB | Age: 74
End: 2024-06-20
Payer: MEDICARE

## 2024-06-20 VITALS
SYSTOLIC BLOOD PRESSURE: 145 MMHG | OXYGEN SATURATION: 99 % | HEIGHT: 59 IN | DIASTOLIC BLOOD PRESSURE: 73 MMHG | WEIGHT: 146.06 LBS | RESPIRATION RATE: 16 BRPM | BODY MASS INDEX: 29.44 KG/M2 | TEMPERATURE: 97.6 F | HEART RATE: 85 BPM

## 2024-06-20 DIAGNOSIS — R73.9 ELEVATED BLOOD SUGAR: ICD-10-CM

## 2024-06-20 DIAGNOSIS — Z01.818 PREOPERATIVE TESTING: ICD-10-CM

## 2024-06-20 DIAGNOSIS — Z01.818 ENCOUNTER FOR OTHER PREPROCEDURAL EXAMINATION: Primary | ICD-10-CM

## 2024-06-20 DIAGNOSIS — Z01.818 PREOPERATIVE TESTING: Primary | ICD-10-CM

## 2024-06-20 LAB
ABO GROUP (TYPE) IN BLOOD: NORMAL
ABO GROUP (TYPE) IN BLOOD: NORMAL
ANION GAP SERPL CALC-SCNC: 14 MMOL/L (ref 10–20)
ANTIBODY SCREEN: NORMAL
ATRIAL RATE: 69 BPM
BASOPHILS # BLD AUTO: 0.03 X10*3/UL (ref 0–0.1)
BASOPHILS NFR BLD AUTO: 0.4 %
BUN SERPL-MCNC: 10 MG/DL (ref 6–23)
CALCIUM SERPL-MCNC: 9.6 MG/DL (ref 8.6–10.3)
CHLORIDE SERPL-SCNC: 102 MMOL/L (ref 98–107)
CO2 SERPL-SCNC: 28 MMOL/L (ref 21–32)
CREAT SERPL-MCNC: 0.55 MG/DL (ref 0.5–1.05)
EGFRCR SERPLBLD CKD-EPI 2021: >90 ML/MIN/1.73M*2
EOSINOPHIL # BLD AUTO: 0.32 X10*3/UL (ref 0–0.4)
EOSINOPHIL NFR BLD AUTO: 3.9 %
ERYTHROCYTE [DISTWIDTH] IN BLOOD BY AUTOMATED COUNT: 12.9 % (ref 11.5–14.5)
EST. AVERAGE GLUCOSE BLD GHB EST-MCNC: 114 MG/DL
GLUCOSE SERPL-MCNC: 90 MG/DL (ref 74–99)
HBA1C MFR BLD: 5.6 %
HCT VFR BLD AUTO: 39.2 % (ref 36–46)
HGB BLD-MCNC: 12.6 G/DL (ref 12–16)
IMM GRANULOCYTES # BLD AUTO: 0.01 X10*3/UL (ref 0–0.5)
IMM GRANULOCYTES NFR BLD AUTO: 0.1 % (ref 0–0.9)
LYMPHOCYTES # BLD AUTO: 1.93 X10*3/UL (ref 0.8–3)
LYMPHOCYTES NFR BLD AUTO: 23.3 %
MCH RBC QN AUTO: 28.8 PG (ref 26–34)
MCHC RBC AUTO-ENTMCNC: 32.1 G/DL (ref 32–36)
MCV RBC AUTO: 90 FL (ref 80–100)
MONOCYTES # BLD AUTO: 0.52 X10*3/UL (ref 0.05–0.8)
MONOCYTES NFR BLD AUTO: 6.3 %
NEUTROPHILS # BLD AUTO: 5.46 X10*3/UL (ref 1.6–5.5)
NEUTROPHILS NFR BLD AUTO: 66 %
NRBC BLD-RTO: NORMAL /100{WBCS}
P AXIS: 52 DEGREES
P OFFSET: 184 MS
P ONSET: 133 MS
PLATELET # BLD AUTO: 315 X10*3/UL (ref 150–450)
POTASSIUM SERPL-SCNC: 3.8 MMOL/L (ref 3.5–5.3)
PR INTERVAL: 160 MS
Q ONSET: 213 MS
QRS COUNT: 12 BEATS
QRS DURATION: 90 MS
QT INTERVAL: 380 MS
QTC CALCULATION(BAZETT): 407 MS
QTC FREDERICIA: 398 MS
R AXIS: -14 DEGREES
RBC # BLD AUTO: 4.37 X10*6/UL (ref 4–5.2)
RH FACTOR (ANTIGEN D): NORMAL
RH FACTOR (ANTIGEN D): NORMAL
SODIUM SERPL-SCNC: 140 MMOL/L (ref 136–145)
T AXIS: 41 DEGREES
T OFFSET: 403 MS
VENTRICULAR RATE: 69 BPM
WBC # BLD AUTO: 8.3 X10*3/UL (ref 4.4–11.3)

## 2024-06-20 PROCEDURE — 83036 HEMOGLOBIN GLYCOSYLATED A1C: CPT

## 2024-06-20 PROCEDURE — 80048 BASIC METABOLIC PNL TOTAL CA: CPT

## 2024-06-20 PROCEDURE — 93005 ELECTROCARDIOGRAM TRACING: CPT

## 2024-06-20 PROCEDURE — 85025 COMPLETE CBC W/AUTO DIFF WBC: CPT

## 2024-06-20 PROCEDURE — 86900 BLOOD TYPING SEROLOGIC ABO: CPT

## 2024-06-20 PROCEDURE — 86901 BLOOD TYPING SEROLOGIC RH(D): CPT

## 2024-06-20 PROCEDURE — 87081 CULTURE SCREEN ONLY: CPT | Mod: BEALAB | Performed by: NURSE PRACTITIONER

## 2024-06-20 PROCEDURE — 93010 ELECTROCARDIOGRAM REPORT: CPT | Performed by: INTERNAL MEDICINE

## 2024-06-20 PROCEDURE — 86850 RBC ANTIBODY SCREEN: CPT

## 2024-06-20 PROCEDURE — 36415 COLL VENOUS BLD VENIPUNCTURE: CPT

## 2024-06-20 RX ORDER — CHLORHEXIDINE GLUCONATE ORAL RINSE 1.2 MG/ML
15 SOLUTION DENTAL AS NEEDED
Qty: 30 ML | Refills: 0 | Status: SHIPPED | OUTPATIENT
Start: 2024-06-20

## 2024-06-20 ASSESSMENT — PAIN - FUNCTIONAL ASSESSMENT: PAIN_FUNCTIONAL_ASSESSMENT: 0-10

## 2024-06-20 ASSESSMENT — PAIN SCALES - GENERAL: PAINLEVEL_OUTOF10: 1

## 2024-06-20 NOTE — CPM/PAT H&P
Patient is an alert and oriented 74 year old female scheduled for a  Open Total Knee Arthroplasty on 7/8/2024 with Dr. Angelo for  Primary osteoarthritis left knee.    The patient reports intermittent achy knee pain.  She rates her pain as a 1-2.  She states getting up from sitting, walking and stair use exacerbates the pain.  She states rest helps.  She has had injections and physical therapy in the past.  Her knee does not swell or gives out    PMHX includes serous cystadenoma of pancrease, anxiety, HTN, GERD, Osteopenia, Colitis, Hiatal hernia, elevated cholesterol, rectal cancer s/p ileostomy.      Presents to [facility] PAT today for perioperative risk stratification and optimization.      Review of Systems   Constitutional: Negative for chills, decreased appetite, diaphoresis, fever and malaise/fatigue.   Eyes:  Negative for blurred vision and double vision.   Cardiovascular:  Negative for chest pain, claudication, cyanosis, dyspnea on exertion, irregular heartbeat, leg swelling, near-syncope and palpitations.   Respiratory:  Negative for cough, hemoptysis, shortness of breath and wheezing.    Endocrine: Negative for cold intolerance, heat intolerance, polydipsia, polyphagia and polyuria.   Gastrointestinal:  Negative for abdominal pain, constipation, diarrhea, dysphagia, nausea and vomiting.   Genitourinary:  Negative for bladder incontinence, dysuria, hematuria, incomplete emptying, nocturia, pelvic pain and urgency.   Neurological:  Negative for headaches, light-headedness, paresthesias, sensory change and weakness.   Psychiatric/Behavioral:  Negative for altered mental status.       Vitals and nursing note reviewed.   Vitals:    06/20/24 1007   BP: 145/73   Pulse: 85   Resp: 16   Temp: 36.4 °C (97.6 °F)   SpO2: 99%       Physical exam  Constitutional:       Appearance: Normal appearance. He is normal weight.   HENT:      Head: Normocephalic and atraumatic.      Mouth/Throat:      Mouth: Mucous membranes are moist.       Pharynx: Oropharynx is clear.   Eyes:      Extraocular Movements: Extraocular movements intact.      Conjunctiva/sclera: Conjunctivae normal.      Pupils: Pupils are equal, round, and reactive to light.   Cardiovascular:      Rate and Rhythm: Normal rate and regular rhythm.      Pulses: Normal pulses.      Heart sounds: Normal heart sounds.      No audible carotid bruit  Pulmonary:      Effort: Pulmonary effort is normal.      Breath sounds: Normal breath sounds.   Abdominal:      General: Abdomen is flat. Bowel sounds are normal.      Palpations: Abdomen is soft.   Musculoskeletal:      Cervical back: Normal range of motion and neck supple.   Deformity of leg leg bows   Skin:     General: Skin is warm and dry.      Capillary Refill: Capillary refill takes less than 2 seconds.   Neurological:      General: No focal deficit present.      Mental Status: He is alert and oriented to person, place, and time. Mental status is at baseline.   Psychiatric:         Mood and Affect: Mood normal.         Behavior: Behavior normal.         Thought Content: Thought content normal.         Judgment: Judgment normal.     Vitals and nursing note reviewed. Physical exam within normal limits.   Vitals:    06/20/24 1007   BP: 145/73   Pulse: 85   Resp: 16   Temp: 36.4 °C (97.6 °F)   SpO2: 99%       Assessment & Plan:    Neuro:  No diagnosis or significant findings on chart review or clinical presentation and evaluation.     Anxiety  Managed with fluoxetine 10mg daily    HEENT/Airway:  No diagnosis or significant findings on chart review or clinical presentation and evaluation.   STOP-BANG Score 2    Mallampati::  IV    TM distance::  >3 FB  Mouth opening 2    Neck ROM::  Full  Has dental crowns and upper left partial    Cardiovascular:  Hypertension  Managed with losartan 50mg daily    Hyperlipidemia  Managed with rosuvastatin 5mg daily  METS: 6.27  RCRI: 0 points, 3.9%  risk for postoperative MACE   MARCEL: 0.2% risk for  postoperative MACE  EKG - NSR rate of 69 possible anterior and inferior infarct age undetermined.  New inferior infarct per cardiology   Will schedule cardiac clearance for the patient   Cardiology appt on 6/21/2024 9:30 am w/ dr. Harris patient is aware  Patient seen by Dr. Harris- patient cleared for surgery - copy in chart and to be scanned.  Also under Dr. Harris's note - cardiology    Pulmonary:  No diagnosis or significant findings on chart review or clinical presentation and evaluation.   ARISCAT: <26 points, 1.6% risk of in-hospital postoperative pulmonary complication  PRODIGY: Moderate risk for opioid induced respiratory depression  Smoking History-does not smoke quit over 50 years social smoker    Renal:  No diagnosis or significant findings on chart review or clinical presentation and evaluation, however, the patient is at increased risk of perioperative renal complications secondary to age>/= 56 and HTN. Preventative measures include  CMP ordered     Endocrine:  No diagnosis or significant findings on chart review or clinical presentation and evaluation.     Hematologic:  CBC ordered   Caprini Score 8, patient at High risk for postoperative DVT. Pt supplied education/VTE handout    Gastrointestinal:   GERD  Managed with pantoprazole 20mg daily    Hx: serous cystadenoma tail of pancrease  Follow up with surgical oncology  Surveillance only  MRCP pancrease 5/20/2024  Increase in size of multi cystic lesion in the pancreatic body/tail  junction. Imaging features favor that of a serous cystic neoplasm    Hx: malignant neoplasm of rectum s/p ileostomy  Alcohol use-none  Drug use-none    Infectious disease:   No diagnosis or significant findings on chart review or clinical presentation and evaluation.   Chlorhexidine 0.12% mouth wash sent to the pharmacy for the patient  Staph screen collected by nursing    Musculoskeletal:   Primary Osteoarthritis of Left Knee  Open total knee arthroplasty  JHFRAT score 5 low  falls risk    Anesthesia:  No anesthesia complications  Family history of anesthesia complications none    Labs & Imaging ordered:  CBC, BMP, A1C, MRSA, Type and Screen, EKG  Nickel/metal allergy-  Shellfish allergy-    Overall, patient at moderate risk for the scheduled surgery. Discussed with patient medication instructions, NPO guidelines, and any questions or concerns. Patient needs no further workup prior to preceding with elective surgery.     Face to face time-30 minutes  MARGUERITE Hopkins, CNP

## 2024-06-20 NOTE — PREPROCEDURE INSTRUCTIONS
Medication List            Accurate as of June 20, 2024 10:15 AM. Always use your most recent med list.                ascorbic acid 100 mg tablet  Commonly known as: Vitamin C  Medication Adjustments for Surgery: Stop 7 days before surgery  Notes to patient: Last dose 7/1/2024     azelastine 137 mcg (0.1 %) nasal spray  Commonly known as: Astelin  Notes to patient: Use as needed     calcium phosphate-vitamin D3 250 mg-12.5 mcg (500 unit) tablet,chewable  Medication Adjustments for Surgery: Stop 7 days before surgery  Notes to patient: Last dose 7/1/2024     chlorhexidine 0.12 % solution  Commonly known as: Peridex  Use 15 mL in the mouth or throat if needed (pre operative 15ml swish and spit the night befor and morning of surgery) for up to 2 doses.  Notes to patient: As directed     FLUoxetine 10 mg capsule  Commonly known as: PROzac  Medication Adjustments for Surgery: Take morning of surgery with sip of water, no other fluids     losartan 50 mg tablet  Commonly known as: Cozaar  Medication Adjustments for Surgery: Stop 1 day before surgery  Notes to patient: Last dose on 7/7/2024     multivitamin with minerals iron-free  Commonly known as: Centrum Silver  Medication Adjustments for Surgery: Stop 7 days before surgery  Notes to patient: Last dose on 7/1/2024     pantoprazole 20 mg EC tablet  Commonly known as: ProtoNix  TAKE 1 TABLET BY MOUTH EVERY DAY  Medication Adjustments for Surgery: Take morning of surgery with sip of water, no other fluids     rosuvastatin 5 mg tablet  Commonly known as: Crestor  Take 1 tablet (5 mg) by mouth once daily.  Medication Adjustments for Surgery: Continue until night before surgery            NPO Instructions:    Do not eat any food after midnight the night before your surgery/procedure.  You may have clear liquids until TWO hours before surgery/procedure. This includes water, black tea/coffee, (no milk or cream) apple juice and electrolyte drinks (Gatorade).  You may chew  gum up to TWO hours before your surgery/procedure.    Additional Instructions:     Seven/Six Days before Surgery:  Review your medication instructions, stop indicated medications  Five Days before Surgery:  Review your medication instructions, stop indicated medications  Begin using your Hibiclens  Three Days before Surgery:  Review your medication instructions, stop indicated medications  The Day before Surgery:  Review your medication instructions, stop indicated medications  You will be contacted regarding the time of your arrival to facility and surgery time  Do not eat any food after Midnight  Day of Surgery:  Review your medication instructions, take indicated medications  You may have clear liquids until TWO hours before surgery/procedure.  This includes water, black tea/coffee, (no milk or cream) apple juice and electrolyte drinks (Gatorade)  You may chew gum up to TWO hours before your surgery/procedure  Wear  comfortable loose fitting clothing  Do not use moisturizers, creams, lotions or perfume  All jewelry and valuables should be left at home  CONTACT SURGEON'S OFFICE IF YOU DEVELOP:  * Fever = 100.4 F   * New respiratory symptoms (e.g. cough, shortness of breath, respiratory distress, sore throat)  * Recent loss of taste or smell  *Flu like symptoms such as headache, fatigue or gastrointestinal symptoms  * You develop any open sores, shingles, burning or painful urination   AND/OR:  * You no longer wish to have the surgery.  * Any other personal circumstances change that may lead to the need to cancel or defer this surgery.  *You were admitted to any hospital within one week of your planned procedure.    SMOKING:  *Quitting smoking can make a huge difference to your health and recovery from surgery.    *If you need help with quitting, call 3-800-QUIT-NOW.    THE DAY BEFORE SURGERY:  *Do not eat any food after midnight the night before your surgery.   *You may have up to 13.5 OUNCES of clear liquids  until TWO hours before your instructed ARRIVAL TIME to hospital. This includes water, black tea/coffee, (no milk or cream) apple juice, clear broth and electrolyte drinks (Gatorade). Please avoid clear liquids that are red in color.   *You may chew gum/mints up to TWO hours before your surgery/procedure.    SURGICAL TIME:  *You will be contacted between 2 p.m. and 3 p.m. the business day before your surgery with your arrival time.  *If you haven't received a call by 3pm, call [phone]  *Scheduled surgery times may change and you will be notified if this occurs-check your personal voicemail for any updates.    ON THE MORNING OF SURGERY:  *Wear comfortable, loose fitting clothing.   *Do not use moisturizers, creams, lotions or perfume.  *All jewelry and valuables should be left at home.  *Prosthetic devices such as contact lenses, hearing aids, dentures, eyelash extensions, hairpins and body piercing must be removed before surgery.    BRING WITH YOU:  *Photo ID and insurance card  *Current list of medications and allergies  *Pacemaker/Defibrillator/Heart stent cards  *CPAP machine and mask  *Slings/splints/crutches  *Copy of your complete Advanced Directive/DHPOA-if applicable  *Neurostimulator implant remote    PARKING AND ARRIVAL:  *Check in at the Main Entrance desk and let them know you are here for surgery.    IF YOU ARE HAVING OUTPATIENT/SAME DAY SURGERY:  *A responsible adult MUST accompany you at the time of discharge and stay with you for 24 hours after your surgery.  *You may NOT drive yourself home after surgery.  *You may use a taxi or ride sharing service ([employer], [employer]) to return home ONLY if you are accompanied by a friend or family member.  *Instructions for resuming your medications will be provided by your surgeon.    Thank you for coming to Pre Admission testing.     If I have prescribe medication please don't forget to  at your pharmacy.     Any questions about today's visit call  [phone] and leave a message in the general mailbox.    Patient instructed to ambulate as soon as possible postoperatively to decrease thromboembolic risk.    Yolande Mcknight, APRN-CNP    Thank you for visiting [facility].  If you have any changes to your health condition, please call the surgeons office to alert them and give them details of your symptoms.        Preoperative Fasting Guidelines    Why must I stop eating and drinking near surgery time?  With sedation, food or liquid in your stomach can enter your lungs causing serious complications  Increases nausea and vomiting    When do I need to stop eating and drinking before my surgery?  Do not eat any food after midnight the night before your surgery/procedure.  You may have up to 13.5 ounces of clear liquid until TWO hours before your instructed arrival time to the hospital.  This includes water, black tea/coffee, (no milk or cream) apple juice, and electrolyte drinks (Gatorade)  You may chew gum until TWO hours before your surgery/procedure      Additional Instructions:     The Day before Surgery:  -Review your medication instructions, stop indicated medications  -You will be contacted in the evening regarding the time of your arrival to facility and surgery time    Day of Surgery:  -Review your medication instructions, take indicated medications  -Wear comfortable loose fitting clothing  -Do not use moisturizers, creams, lotions or perfume  -All jewelry and valuables should be left at home              Preoperative Brain Exercises    What are brain exercises?  A brain exercise is any activity that engages your thinking (cognitive) skills.    What types of activities are considered brain exercises?  Jigsaw puzzles, crossword puzzles, word jumble, memory games, word search, and many more.  Many can be found free online or on your phone via a mobile jerardo.    Why should I do brain exercises before my surgery?  More recent research  has shown brain exercise before surgery can lower the risk of postoperative delirium (confusion) which can be especially important for older adults.  Patients who did brain exercises for 5 to 10 hours the days before surgery, cut their risk of postoperative delirium in half up to 1 week after surgery.                  [facility]    Preoperative Deep Breathing Exercises    Why it is important to do deep breathing exercises before my surgery?  Deep breathing exercises strengthen your breathing muscles.  This helps you to recover after your surgery and decreases the chance of breathing complications.      How are the deep breathing exercises done?  Sit straight with your back supported.  Breathe in deeply and slowly through your nose. Your lower rib cage should expand and your abdomen may move forward.  Hold that breath for 3 to 5 seconds.  Breathe out through pursed lips, slowly and completely.  Rest and repeat 10 times every hour while awake.  Rest longer if you become dizzy or lightheaded.                [facility]    Preoperative Deep Breathing Exercises    Why it is important to do deep breathing exercises before my surgery?  Deep breathing exercises strengthen your breathing muscles.  This helps you to recover after your surgery and decreases the chance of breathing complications.      How are the deep breathing exercises done?  Sit straight with your back supported.  Breathe in deeply and slowly through your nose. Your lower rib cage should expand and your abdomen may move forward.  Hold that breath for 3 to 5 seconds.  Breathe out through pursed lips, slowly and completely.  Rest and repeat 10 times every hour while awake.  Rest longer if you become dizzy or lightheaded.      Patient Information: Incentive Spirometer  What is an incentive spirometer?  An incentive spirometer is a device used before and after surgery to “exercise” your lungs.  It helps you to  take deeper breaths to expand your lungs.  Below is an example of a basic incentive spirometer.  The device you receive may differ slightly but they all function the same.    Why do I need to use an incentive spirometer?  Using your incentive spirometer prepares your lungs for surgery and helps prevent lung problems after surgery.  How do I use my incentive spirometer?  When you're using your incentive spirometer, make sure to breathe through your mouth. If you breathe through your nose, the incentive spirometer won't work properly. You can hold your nose if you have trouble.  If you feel dizzy at any time, stop and rest. Try again at a later time.  Follow the steps below:  Set up your incentive spirometer, expand the flexible tubing and connect to the outlet.  Sit upright in a chair or bed. Hold the incentive spirometer at eye level.   Put the mouthpiece in your mouth and close your lips tightly around it. Slowly breathe out (exhale) completely.  Breathe in (inhale) slowly through your mouth as deeply as you can. As you take a breath, you will see the piston rise inside the large column. While the piston rises, the indicator should move upwards. It should stay in between the 2 arrows (see Figure).  Try to get the piston as high as you can, while keeping the indicator between the arrows.   If the indicator doesn't stay between the arrows, you're breathing either too fast or too slow.  When you get it as high as you can, hold your breath for 10 seconds, or as long as possible. While you're holding your breath, the piston will slowly fall to the base of the spirometer.  Once the piston reaches the bottom of the spirometer, breathe out slowly through your mouth. Rest for a few seconds.  Repeat 10 times. Try to get the piston to the same level with each breath.  Repeat every hour while awake  You can carefully clean the outside of the mouthpiece with an alcohol wipe or soap and water.      Patient and Family Education              Ways You Can Help Prevent Blood Clots             This handout explains some simple things you can do to help prevent blood clots.      Blood clots are blockages that can form in the body's veins. When a blood clot forms in your deep veins, it may be called a deep vein thrombosis, or DVT for short. Blood clots can happen in any part of the body where blood flows, but they are most common in the arms and legs. If a piece of a blood clot breaks free and travels to the lungs, it is called a pulmonary embolus (PE). A PE can be a very serious problem.         Being in the hospital or having surgery can raise your chances of getting a blood clot because you may not be well enough to move around as much as you normally do.         Ways you can help prevent blood clots in the hospital         Wearing SCDs. SCDs stands for Sequential Compression Devices.   SCDs are special sleeves that wrap around your legs  They attach to a pump that fills them with air to gently squeeze your legs every few minutes.   This helps return the blood in your legs to your heart.   SCDs should only be taken off when walking or bathing.   SCDs may not be comfortable, but they can help save your life.               Wearing compression stockings - if your doctor orders them. These special snug fitting stockings gently squeeze your legs to help blood flow.       Walking. Walking helps move the blood in your legs.   If your doctor says it is ok, try walking the halls at least   5 times a day. Ask us to help you get up, so you don't fall.      Taking any blood thinning medicines your doctor orders.        Page 1 of 2     [facility]; 3/23   Ways you can help prevent blood clots at home       Wearing compression stockings - if your doctor orders them. ? Walking - to help move the blood in your legs.       Taking any blood thinning medicines your doctor orders.      Signs of a blood clot or PE      Tell your doctor or nurse know  right away if you have of the problems listed below.    If you are at home, seek medical care right away. Call 911 for chest pain or problems breathing.               Signs of a blood clot (DVT) - such as pain,  swelling, redness or warmth in your arm or leg      Signs of a pulmonary embolism (PE) - such as chest     pain or feeling short of breath

## 2024-06-20 NOTE — H&P (VIEW-ONLY)
Patient is an alert and oriented 74 year old female scheduled for a  Open Total Knee Arthroplasty on 7/8/2024 with Dr. Angelo for  Primary osteoarthritis left knee.    The patient reports intermittent achy knee pain.  She rates her pain as a 1-2.  She states getting up from sitting, walking and stair use exacerbates the pain.  She states rest helps.  She has had injections and physical therapy in the past.  Her knee does not swell or gives out    PMHX includes serous cystadenoma of pancrease, anxiety, HTN, GERD, Osteopenia, Colitis, Hiatal hernia, elevated cholesterol, rectal cancer s/p ileostomy.      Presents to [facility] today for perioperative risk stratification and optimization.      Review of Systems   Constitutional: Negative for chills, decreased appetite, diaphoresis, fever and malaise/fatigue.   Eyes:  Negative for blurred vision and double vision.   Cardiovascular:  Negative for chest pain, claudication, cyanosis, dyspnea on exertion, irregular heartbeat, leg swelling, near-syncope and palpitations.   Respiratory:  Negative for cough, hemoptysis, shortness of breath and wheezing.    Endocrine: Negative for cold intolerance, heat intolerance, polydipsia, polyphagia and polyuria.   Gastrointestinal:  Negative for abdominal pain, constipation, diarrhea, dysphagia, nausea and vomiting.   Genitourinary:  Negative for bladder incontinence, dysuria, hematuria, incomplete emptying, nocturia, pelvic pain and urgency.   Neurological:  Negative for headaches, light-headedness, paresthesias, sensory change and weakness.   Psychiatric/Behavioral:  Negative for altered mental status.       Vitals and nursing note reviewed.   Vitals:    06/20/24 1007   BP: 145/73   Pulse: 85   Resp: 16   Temp: 36.4 °C (97.6 °F)   SpO2: 99%       Physical exam  Constitutional:       Appearance: Normal appearance. He is normal weight.   HENT:      Head: Normocephalic and atraumatic.      Mouth/Throat:      Mouth: Mucous membranes are moist.       Pharynx: Oropharynx is clear.   Eyes:      Extraocular Movements: Extraocular movements intact.      Conjunctiva/sclera: Conjunctivae normal.      Pupils: Pupils are equal, round, and reactive to light.   Cardiovascular:      Rate and Rhythm: Normal rate and regular rhythm.      Pulses: Normal pulses.      Heart sounds: Normal heart sounds.      No audible carotid bruit  Pulmonary:      Effort: Pulmonary effort is normal.      Breath sounds: Normal breath sounds.   Abdominal:      General: Abdomen is flat. Bowel sounds are normal.      Palpations: Abdomen is soft.   Musculoskeletal:      Cervical back: Normal range of motion and neck supple.   Deformity of leg leg bows   Skin:     General: Skin is warm and dry.      Capillary Refill: Capillary refill takes less than 2 seconds.   Neurological:      General: No focal deficit present.      Mental Status: He is alert and oriented to person, place, and time. Mental status is at baseline.   Psychiatric:         Mood and Affect: Mood normal.         Behavior: Behavior normal.         Thought Content: Thought content normal.         Judgment: Judgment normal.     Vitals and nursing note reviewed. Physical exam within normal limits.   Vitals:    06/20/24 1007   BP: 145/73   Pulse: 85   Resp: 16   Temp: 36.4 °C (97.6 °F)   SpO2: 99%       Assessment & Plan:    Neuro:  No diagnosis or significant findings on chart review or clinical presentation and evaluation.     Anxiety  Managed with fluoxetine 10mg daily    HEENT/Airway:  No diagnosis or significant findings on chart review or clinical presentation and evaluation.   STOP-BANG Score 2    Mallampati::  IV    TM distance::  >3 FB  Mouth opening 2    Neck ROM::  Full  Has dental crowns and upper left partial    Cardiovascular:  Hypertension  Managed with losartan 50mg daily    Hyperlipidemia  Managed with rosuvastatin 5mg daily  METS: 6.27  RCRI: 0 points, 3.9%  risk for postoperative MACE   MARCEL: 0.2% risk for  postoperative MACE  EKG - NSR rate of 69 possible anterior and inferior infarct age undetermined.  New inferior infarct per cardiology   Will schedule cardiac clearance for the patient   Cardiology appt on 6/21/2024 9:30 am w/ dr. Harris patient is aware  Patient seen by Dr. Harris- patient cleared for surgery - copy in chart and to be scanned.  Also under Dr. Harris's note - cardiology    Pulmonary:  No diagnosis or significant findings on chart review or clinical presentation and evaluation.   ARISCAT: <26 points, 1.6% risk of in-hospital postoperative pulmonary complication  PRODIGY: Moderate risk for opioid induced respiratory depression  Smoking History-does not smoke quit over 50 years social smoker    Renal:  No diagnosis or significant findings on chart review or clinical presentation and evaluation, however, the patient is at increased risk of perioperative renal complications secondary to age>/= 56 and HTN. Preventative measures include  CMP ordered     Endocrine:  No diagnosis or significant findings on chart review or clinical presentation and evaluation.     Hematologic:  CBC ordered   Caprini Score 8, patient at High risk for postoperative DVT. Pt supplied education/VTE handout    Gastrointestinal:   GERD  Managed with pantoprazole 20mg daily    Hx: serous cystadenoma tail of pancrease  Follow up with surgical oncology  Surveillance only  MRCP pancrease 5/20/2024  Increase in size of multi cystic lesion in the pancreatic body/tail  junction. Imaging features favor that of a serous cystic neoplasm    Hx: malignant neoplasm of rectum s/p ileostomy  Alcohol use-none  Drug use-none    Infectious disease:   No diagnosis or significant findings on chart review or clinical presentation and evaluation.   Chlorhexidine 0.12% mouth wash sent to the pharmacy for the patient  Staph screen collected by nursing    Musculoskeletal:   Primary Osteoarthritis of Left Knee  Open total knee arthroplasty  JHFRAT score 5 low  falls risk    Anesthesia:  No anesthesia complications  Family history of anesthesia complications none    Labs & Imaging ordered:  CBC, BMP, A1C, MRSA, Type and Screen, EKG  Nickel/metal allergy-  Shellfish allergy-    Overall, patient at moderate risk for the scheduled surgery. Discussed with patient medication instructions, NPO guidelines, and any questions or concerns. Patient needs no further workup prior to preceding with elective surgery.     Face to face time-30 minutes  MARGUERITE Hopkins, CNP

## 2024-06-21 ENCOUNTER — OFFICE VISIT (OUTPATIENT)
Dept: CARDIOLOGY | Facility: CLINIC | Age: 74
End: 2024-06-21
Payer: MEDICARE

## 2024-06-21 VITALS
DIASTOLIC BLOOD PRESSURE: 62 MMHG | OXYGEN SATURATION: 98 % | WEIGHT: 145 LBS | RESPIRATION RATE: 16 BRPM | HEIGHT: 59 IN | TEMPERATURE: 98.6 F | SYSTOLIC BLOOD PRESSURE: 129 MMHG | BODY MASS INDEX: 29.23 KG/M2 | HEART RATE: 77 BPM

## 2024-06-21 DIAGNOSIS — E78.00 PURE HYPERCHOLESTEROLEMIA: ICD-10-CM

## 2024-06-21 DIAGNOSIS — I10 BENIGN ESSENTIAL HYPERTENSION: Primary | ICD-10-CM

## 2024-06-21 DIAGNOSIS — E66.9 OBESITY, CLASS I, BMI 30.0-34.9 (SEE ACTUAL BMI): ICD-10-CM

## 2024-06-21 DIAGNOSIS — F41.9 ANXIETY: ICD-10-CM

## 2024-06-21 DIAGNOSIS — R94.31 ABNORMAL EKG: ICD-10-CM

## 2024-06-21 PROCEDURE — 99204 OFFICE O/P NEW MOD 45 MIN: CPT | Performed by: INTERNAL MEDICINE

## 2024-06-21 PROCEDURE — 1126F AMNT PAIN NOTED NONE PRSNT: CPT | Performed by: INTERNAL MEDICINE

## 2024-06-21 PROCEDURE — 1157F ADVNC CARE PLAN IN RCRD: CPT | Performed by: INTERNAL MEDICINE

## 2024-06-21 PROCEDURE — 1159F MED LIST DOCD IN RCRD: CPT | Performed by: INTERNAL MEDICINE

## 2024-06-21 PROCEDURE — 1036F TOBACCO NON-USER: CPT | Performed by: INTERNAL MEDICINE

## 2024-06-21 PROCEDURE — 3078F DIAST BP <80 MM HG: CPT | Performed by: INTERNAL MEDICINE

## 2024-06-21 PROCEDURE — 3074F SYST BP LT 130 MM HG: CPT | Performed by: INTERNAL MEDICINE

## 2024-06-21 RX ORDER — ROSUVASTATIN CALCIUM 10 MG/1
10 TABLET, COATED ORAL DAILY
Qty: 90 TABLET | Refills: 3 | Status: SHIPPED | OUTPATIENT
Start: 2024-06-21 | End: 2025-06-21

## 2024-06-21 RX ORDER — ALPRAZOLAM 0.5 MG/1
0.5 TABLET ORAL
COMMUNITY
Start: 2024-04-19 | End: 2024-09-19

## 2024-06-21 ASSESSMENT — LIFESTYLE VARIABLES
HOW OFTEN DO YOU HAVE A DRINK CONTAINING ALCOHOL: NEVER
SKIP TO QUESTIONS 9-10: 1
AUDIT-C TOTAL SCORE: 0
HOW OFTEN DO YOU HAVE SIX OR MORE DRINKS ON ONE OCCASION: NEVER
HOW MANY STANDARD DRINKS CONTAINING ALCOHOL DO YOU HAVE ON A TYPICAL DAY: PATIENT DOES NOT DRINK

## 2024-06-21 ASSESSMENT — PATIENT HEALTH QUESTIONNAIRE - PHQ9
SUM OF ALL RESPONSES TO PHQ9 QUESTIONS 1 AND 2: 0
1. LITTLE INTEREST OR PLEASURE IN DOING THINGS: NOT AT ALL
2. FEELING DOWN, DEPRESSED OR HOPELESS: NOT AT ALL

## 2024-06-21 ASSESSMENT — ENCOUNTER SYMPTOMS
OCCASIONAL FEELINGS OF UNSTEADINESS: 1
LOSS OF SENSATION IN FEET: 0
DEPRESSION: 0

## 2024-06-21 ASSESSMENT — PAIN SCALES - GENERAL: PAINLEVEL: 0-NO PAIN

## 2024-06-21 NOTE — PROGRESS NOTES
Subjective   Chief Complaint   Patient presents with    Pre-op Clearance     Carmella Shin is a new patient who presents to the office today referred by Pre Admission Testing for EKG changes/abnormal result. Comparison EKG is from 2016.         74 female patient with history of hypertension, GERD disorder, colitis, hiatal hernia with hyperlipidemia.  Patient.  Evaluation for open total knee arthroplasty at the time patient found to be abnormal EKG upcoming surgery in July 8.  Complain of on and off achy knee pain.  Does worsening symptoms while moving activity in the knee.  Her injection as well as the physical therapy the knee.  Does not swell.  No prior cardiac history.    Patient has not seen a cardiologist since 2016.  Study.  Patient had a EKG done yesterday found to be in normal sinus rhythm the question for infarct with nonspecific ST-T changes of the left anterior fascicular block.  Patient had multiple blood work done yesterday hemoglobin A1c yesterday was 5.6%.  CBC was within normal range.  BMP was also done from within normal range.  Patient lipid profile done about a 7 months ago total cholesterol 199, LDL is 110 mg deciliter.  Triglyceride 117.  Patient and has underlying anxiety disorders.  Had a diagnostic catheterization done in 2016 March.  Patient found to having no significant angiographic coronary disease.  Normal ejection fraction.  Now referred here for preop evaluation for knee surgery due to abnormal EKG.  Electrocardiogram in the March 16, 2016 essentially had sinus tachycardia with a nonspecific ST-T changes seen.  No significant ST-T changes seen on EKG.  Patient asymptomatic at the moment.  Currently active.  Patient walk more than 500 feet from parking lot to office without any symptoms except knee pain.  No active chest pain tightness.  No active angina CHF signs symptoms at the moment.  Past Medical History:   Diagnosis Date    Allergic contact dermatitis due to plants, except food  2013    Contact dermatitis due to poison ivy    Allergy status to unspecified drugs, medicaments and biological substances 2017    History of allergic drug reaction    Anxiety     Benign neoplasm of meninges, unspecified (Multi) 2013    Meningioma    Cancer (Multi)     Chronic rhinitis 2013    Rhinitis    Encounter for screening for malignant neoplasm of cervix 2015    Cervical cancer screening    GERD (gastroesophageal reflux disease)     Hypertension     Impacted cerumen, right ear 2017    Excessive cerumen in right ear canal    Other chest pain 2016    Atypical chest pain    Personal history of other diseases of the digestive system     History of ulcerative colitis    Personal history of other diseases of the respiratory system 2014    History of pharyngitis    Personal history of other diseases of the respiratory system 2014    History of acute sinusitis    Personal history of other drug therapy 2017    History of influenza vaccination    Personal history of other infectious and parasitic diseases     History of varicella    Personal history of other malignant neoplasm of rectum, rectosigmoid junction, and anus     History of malignant neoplasm of rectum    Pure hypercholesterolemia, unspecified     Elevated cholesterol     Past Surgical History:   Procedure Laterality Date    CARDIAC CATHETERIZATION      CATARACT EXTRACTION  2013    Cataract Surgery     SECTION, CLASSIC  2013     Section    COLECTOMY  2013    Partial Colectomy    COLECTOMY  2013    Partial Colectomy    COLON SURGERY  10/06/2017    Colon Surgery    EYE SURGERY  10/06/2017    Eye Surgery    FOOT SURGERY Right     bunionectomy    ILEOSTOMY  2013    Ileostomy    OTHER SURGICAL HISTORY  2014    Surgically Induced     OTHER SURGICAL HISTORY  2020    Colectomy total    TONSILLECTOMY  2013    Tonsillectomy    TOTAL  ABDOMINAL HYSTERECTOMY W/ BILATERAL SALPINGOOPHORECTOMY  05/01/2013    Salpingo-oophorectomy Bilateral     No relevant family history has been documented for this patient.  Current Outpatient Medications   Medication Sig Dispense Refill    ALPRAZolam (Xanax) 0.5 mg tablet Take 1 tablet (0.5 mg) by mouth. Take one tablet prior to falling.      ascorbic acid (Vitamin C) 100 mg tablet Take 1 tablet (100 mg) by mouth once daily. As directed      azelastine (Astelin) 137 mcg (0.1 %) nasal spray Administer 2 sprays into each nostril once daily as needed.      calcium phosphate-vitamin D3 250 mg-12.5 mcg (500 unit) tablet,chewable TAKES TOTAL 1250      chlorhexidine (Peridex) 0.12 % solution Use 15 mL in the mouth or throat if needed (pre operative 15ml swish and spit the night befor and morning of surgery) for up to 2 doses. 30 mL 0    FLUoxetine (PROzac) 10 mg capsule Take 1 capsule (10 mg) by mouth once daily.      losartan (Cozaar) 50 mg tablet Take 1 tablet (50 mg) by mouth once daily.      multivit-iron-minerals-folic acid (Centrum Silver, geriatric,) 0.4 mg-300 mcg- 250 mcg tab Take 1 tablet by mouth once daily.      pantoprazole (ProtoNix) 20 mg EC tablet TAKE 1 TABLET BY MOUTH EVERY DAY 90 tablet 3    rosuvastatin (Crestor) 5 mg tablet Take 1 tablet (5 mg) by mouth once daily. 90 tablet 3     No current facility-administered medications for this visit.      reports that she quit smoking about 50 years ago. Her smoking use included cigarettes. She started smoking about 55 years ago. She has a 6.3 pack-year smoking history. She has been exposed to tobacco smoke. She has never used smokeless tobacco. She reports that she does not drink alcohol and does not use drugs.  Vancomycin, Ace inhibitors, Bupropion, Escitalopram oxalate, Metronidazole, Penicillins, Sulfa (sulfonamide antibiotics), and Venlafaxine  Benign essential hypertension    Vitals:    06/21/24 0940   BP: 129/62   Pulse: 77   Resp: 16   Temp: 37 °C (98.6  "°F)   SpO2: 98%   Weight: 65.8 kg (145 lb)   Height: 1.499 m (4' 11\")   PainSc: 0-No pain      BMI:Body mass index is 29.29 kg/m².   General Cardiology:  General Appearance: Alert, oriented and in no acute distress.  HEENT: extra ocular movements intact (EOMI), pupils equal,  round, reactive to light and accommodation (PERRLA).  Carotid Upstroke: no bruit, normal.  Jugular Venous Distention (JVD): flat.  Chest: normal.  Lungs: Clear to auscultation,   Heart Sounds: no S3 or S4, normal S1, S2, regular rate.  Murmur, Click, Gallop: no systolic murmur.  Abdomen: no hepatomegaly, no masses felt, soft.  Extremities: no leg edema.  Peripheral pulses: 2 plus bilateral.  NEUROLOGY Cranial nerves II-XII grossly intact.     Patient Active Problem List   Diagnosis    Abdominal pain    Anxiety    Arthritis of knee, left    Benign essential hypertension    Depression    Elevated TSH    Esophageal reflux    Fear of flying    Flank pain    Hematuria    Intermittent chest pain    Left-sided chest wall pain    Localized chest pain    Ileostomy present (Multi)    Myalgia    Osteopenia    Pain in lateral left upper extremity    Poor posture    Pure hypercholesterolemia    Sciatica of right side    Seasonal allergies    Serous cystadenoma of pancreas    Vitamin D insufficiency    Chronic pain of both shoulders    Borderline glaucoma with ocular hypertension    Keratoconus, stable condition    Senile nuclear sclerosis    Obesity, Class I, BMI 30.0-34.9 (see actual BMI)    Medicare annual wellness visit, subsequent    Meningioma (Multi)    Abnormal EKG       Problem List Items Addressed This Visit       Anxiety    Benign essential hypertension - Primary    Pure hypercholesterolemia    Obesity, Class I, BMI 30.0-34.9 (see actual BMI)    Abnormal EKG      74 female patient with a history of hypertension, anxiety disorders.  Upcoming total knee arthroplasty surgery next month.  Patient had EKG found having a sinus rhythm with a left " intrafascicular block with questionable dorothy-infarct.  Although patient had a diagnostic catheterization done 2016 with noncritical CAD.  So far CBC, BMP, lipid profile unremarkable.  Patient currently on anxiety medicine with losartan 50 mg once a day and Crestor 5 mg tablet daily.  1.  Preop clearance: Patient is a moderate risk from cardiac perspective.  Patient does walk more than 500 feet several times.  No active chest pain tightness with moderate activity.  Patient walked from parking lot to the office which is more than 500 feet.  No active angina CHF sign symptoms.  No contraindication for surgery.  2.  Hyperlipidemia: Will increase Crestor to 10 mg tablet p.o. daily.    Advised patient to avoid lunch meats, canned soups, pizzas, bread rolls, and sandwiches. Advised patient to limit salt intake 1,500 mg daily. Advised patient to exercise 30 mins/3 times a week including treadmill or aerobic type, Goal to achieve 65% target HR.    Diet and exercise reviewed with patient..advice to walk about 10,000 steps or about 2 hours during day time. Cut back on salt, sugar and flour.      Jerman Harris MD

## 2024-06-21 NOTE — LETTER
June 21, 2024     Yolande Mcknight, APRN-CNP  [address]  [facility], [address]  [facility] [id number]    Patient: Carmella Shin   YOB: 1950   Date of Visit: 6/21/2024       Dear Dr. Yolande Mcknight, APRN-CNP:    Thank you for referring Carmella Shin to me for evaluation. Below are my notes for this consultation.  If you have questions, please do not hesitate to call me. I look forward to following your patient along with you.  Patient stable cardiac wise, continue beta-blocker or rate control medication.  Moderate risk for CV events during the surgery.  No contraindication for surgery.  Hold blood thinner 48 hours before surgery.       Sincerely,     Jerman Harris MD      CC: Mynor Angelo, DO Cyn Babin, MARGUERITE-ARMIDA  ______________________________________________________________________________________    Subjective  Chief Complaint   Patient presents with   • Pre-op Clearance     Carmella Shin is a new patient who presents to the office today referred by Pre Admission Testing for EKG changes/abnormal result. Comparison EKG is from 2016.         74 female patient with history of hypertension, GERD disorder, colitis, hiatal hernia with hyperlipidemia.  Patient.  Evaluation for open total knee arthroplasty at the time patient found to be abnormal EKG upcoming surgery in July 8.  Complain of on and off achy knee pain.  Does worsening symptoms while moving activity in the knee.  Her injection as well as the physical therapy the knee.  Does not swell.  No prior cardiac history.    Patient has not seen a cardiologist since 2016.  Study.  Patient had a EKG done yesterday found to be in normal sinus rhythm the question for infarct with nonspecific ST-T changes of the left anterior fascicular block.  Patient had multiple blood work done yesterday hemoglobin A1c yesterday was 5.6%.  CBC was within normal range.  BMP was also done from within normal range.  Patient lipid profile done about a 7  months ago total cholesterol 199, LDL is 110 mg deciliter.  Triglyceride 117.  Patient and has underlying anxiety disorders.  Had a diagnostic catheterization done in 2016 March.  Patient found to having no significant angiographic coronary disease.  Normal ejection fraction.  Now referred here for preop evaluation for knee surgery due to abnormal EKG.  Electrocardiogram in the March 16, 2016 essentially had sinus tachycardia with a nonspecific ST-T changes seen.  No significant ST-T changes seen on EKG.  Patient asymptomatic at the moment.  Currently active.  Patient walk more than 500 feet from parking lot to office without any symptoms except knee pain.  No active chest pain tightness.  No active angina CHF signs symptoms at the moment.  Past Medical History:   Diagnosis Date   • Allergic contact dermatitis due to plants, except food 09/23/2013    Contact dermatitis due to poison ivy   • Allergy status to unspecified drugs, medicaments and biological substances 09/26/2017    History of allergic drug reaction   • Anxiety    • Benign neoplasm of meninges, unspecified (Multi) 06/17/2013    Meningioma   • Cancer (Multi)    • Chronic rhinitis 09/23/2013    Rhinitis   • Encounter for screening for malignant neoplasm of cervix 11/04/2015    Cervical cancer screening   • GERD (gastroesophageal reflux disease)    • Hypertension    • Impacted cerumen, right ear 09/20/2017    Excessive cerumen in right ear canal   • Other chest pain 03/16/2016    Atypical chest pain   • Personal history of other diseases of the digestive system     History of ulcerative colitis   • Personal history of other diseases of the respiratory system 09/02/2014    History of pharyngitis   • Personal history of other diseases of the respiratory system 08/21/2014    History of acute sinusitis   • Personal history of other drug therapy 09/20/2017    History of influenza vaccination   • Personal history of other infectious and parasitic diseases      History of varicella   • Personal history of other malignant neoplasm of rectum, rectosigmoid junction, and anus     History of malignant neoplasm of rectum   • Pure hypercholesterolemia, unspecified     Elevated cholesterol     Past Surgical History:   Procedure Laterality Date   • CARDIAC CATHETERIZATION     • CATARACT EXTRACTION  2013    Cataract Surgery   •  SECTION, CLASSIC  2013     Section   • COLECTOMY  2013    Partial Colectomy   • COLECTOMY  2013    Partial Colectomy   • COLON SURGERY  10/06/2017    Colon Surgery   • EYE SURGERY  10/06/2017    Eye Surgery   • FOOT SURGERY Right     bunionectomy   • ILEOSTOMY  2013    Ileostomy   • OTHER SURGICAL HISTORY  2014    Surgically Induced    • OTHER SURGICAL HISTORY  2020    Colectomy total   • TONSILLECTOMY  2013    Tonsillectomy   • TOTAL ABDOMINAL HYSTERECTOMY W/ BILATERAL SALPINGOOPHORECTOMY  2013    Salpingo-oophorectomy Bilateral     No relevant family history has been documented for this patient.  Current Outpatient Medications   Medication Sig Dispense Refill   • ALPRAZolam (Xanax) 0.5 mg tablet Take 1 tablet (0.5 mg) by mouth. Take one tablet prior to falling.     • ascorbic acid (Vitamin C) 100 mg tablet Take 1 tablet (100 mg) by mouth once daily. As directed     • azelastine (Astelin) 137 mcg (0.1 %) nasal spray Administer 2 sprays into each nostril once daily as needed.     • calcium phosphate-vitamin D3 250 mg-12.5 mcg (500 unit) tablet,chewable TAKES TOTAL 1250     • chlorhexidine (Peridex) 0.12 % solution Use 15 mL in the mouth or throat if needed (pre operative 15ml swish and spit the night befor and morning of surgery) for up to 2 doses. 30 mL 0   • FLUoxetine (PROzac) 10 mg capsule Take 1 capsule (10 mg) by mouth once daily.     • losartan (Cozaar) 50 mg tablet Take 1 tablet (50 mg) by mouth once daily.     • multivit-iron-minerals-folic acid (Centrum Silver,  "geriatric,) 0.4 mg-300 mcg- 250 mcg tab Take 1 tablet by mouth once daily.     • pantoprazole (ProtoNix) 20 mg EC tablet TAKE 1 TABLET BY MOUTH EVERY DAY 90 tablet 3   • rosuvastatin (Crestor) 5 mg tablet Take 1 tablet (5 mg) by mouth once daily. 90 tablet 3     No current facility-administered medications for this visit.      reports that she quit smoking about 50 years ago. Her smoking use included cigarettes. She started smoking about 55 years ago. She has a 6.3 pack-year smoking history. She has been exposed to tobacco smoke. She has never used smokeless tobacco. She reports that she does not drink alcohol and does not use drugs.  Vancomycin, Ace inhibitors, Bupropion, Escitalopram oxalate, Metronidazole, Penicillins, Sulfa (sulfonamide antibiotics), and Venlafaxine  Benign essential hypertension    Vitals:    06/21/24 0940   BP: 129/62   Pulse: 77   Resp: 16   Temp: 37 °C (98.6 °F)   SpO2: 98%   Weight: 65.8 kg (145 lb)   Height: 1.499 m (4' 11\")   PainSc: 0-No pain      BMI:Body mass index is 29.29 kg/m².   General Cardiology:  General Appearance: Alert, oriented and in no acute distress.  HEENT: extra ocular movements intact (EOMI), pupils equal,  round, reactive to light and accommodation (PERRLA).  Carotid Upstroke: no bruit, normal.  Jugular Venous Distention (JVD): flat.  Chest: normal.  Lungs: Clear to auscultation,   Heart Sounds: no S3 or S4, normal S1, S2, regular rate.  Murmur, Click, Gallop: no systolic murmur.  Abdomen: no hepatomegaly, no masses felt, soft.  Extremities: no leg edema.  Peripheral pulses: 2 plus bilateral.  NEUROLOGY Cranial nerves II-XII grossly intact.     Patient Active Problem List   Diagnosis   • Abdominal pain   • Anxiety   • Arthritis of knee, left   • Benign essential hypertension   • Depression   • Elevated TSH   • Esophageal reflux   • Fear of flying   • Flank pain   • Hematuria   • Intermittent chest pain   • Left-sided chest wall pain   • Localized chest pain   • " Ileostomy present (Multi)   • Myalgia   • Osteopenia   • Pain in lateral left upper extremity   • Poor posture   • Pure hypercholesterolemia   • Sciatica of right side   • Seasonal allergies   • Serous cystadenoma of pancreas   • Vitamin D insufficiency   • Chronic pain of both shoulders   • Borderline glaucoma with ocular hypertension   • Keratoconus, stable condition   • Senile nuclear sclerosis   • Obesity, Class I, BMI 30.0-34.9 (see actual BMI)   • Medicare annual wellness visit, subsequent   • Meningioma (Multi)   • Abnormal EKG       Problem List Items Addressed This Visit       Anxiety    Benign essential hypertension - Primary    Pure hypercholesterolemia    Obesity, Class I, BMI 30.0-34.9 (see actual BMI)    Abnormal EKG      74 female patient with a history of hypertension, anxiety disorders.  Upcoming total knee arthroplasty surgery next month.  Patient had EKG found having a sinus rhythm with a left intrafascicular block with questionable dorothy-infarct.  Although patient had a diagnostic catheterization done 2016 with noncritical CAD.  So far CBC, BMP, lipid profile unremarkable.  Patient currently on anxiety medicine with losartan 50 mg once a day and Crestor 5 mg tablet daily.  1.  Preop clearance: Patient is a moderate risk from cardiac perspective.  Patient does walk more than 500 feet several times.  No active chest pain tightness with moderate activity.  Patient walked from parking lot to the office which is more than 500 feet.  No active angina CHF sign symptoms.  No contraindication for surgery.  2.  Hyperlipidemia: Will increase Crestor to 10 mg tablet p.o. daily.    Advised patient to avoid lunch meats, canned soups, pizzas, bread rolls, and sandwiches. Advised patient to limit salt intake 1,500 mg daily. Advised patient to exercise 30 mins/3 times a week including treadmill or aerobic type, Goal to achieve 65% target HR.    Diet and exercise reviewed with patient..advice to walk about 10,000  steps or about 2 hours during day time. Cut back on salt, sugar and flour.      Jerman Harris MD

## 2024-06-22 LAB — STAPHYLOCOCCUS SPEC CULT: ABNORMAL

## 2024-07-01 ENCOUNTER — TELEPHONE (OUTPATIENT)
Dept: ORTHOPEDIC SURGERY | Facility: HOSPITAL | Age: 74
End: 2024-07-01
Payer: MEDICARE

## 2024-07-01 NOTE — TELEPHONE ENCOUNTER
Thank you for taking my call today.  All questions were answered at the time of the call, but please feel free to reach out to me via MyChart or phone, [phone], with any new questions or concerns.       We confirmed that you opted to enroll in our [id number] program so your discharge prescriptions will be available to take home at the time of discharge.  Please bring any prescription insurance coverage with you on the morning of surgery so that we can enter the information into our system.     We confirmed that your plan would be to Stay Overnight.    We confirmed that you have DME needed for recovery.     Use the provided body wash for 4 days before surgery and complete a 5th shower on the morning of surgery, this includes your body and hair.  Follow the directions as provided during preadmission testing.  The mouth wash will be used the night before and the morning of surgery.       As a reminder, if you do not hear from our team, please call [phone] between 2pm and 3pm the business day before your surgery to confirm your arrival time and details.    Please don't hesitate to reach out with additional questions or concerns.    Sravani Tavarez MBA, BSN, RN-BC  MIKAL RappN, RN  [facility]  [facility]  [phone]

## 2024-07-08 ENCOUNTER — ANESTHESIA (OUTPATIENT)
Dept: OPERATING ROOM | Facility: HOSPITAL | Age: 74
End: 2024-07-08
Payer: MEDICARE

## 2024-07-08 ENCOUNTER — PHARMACY VISIT (OUTPATIENT)
Dept: PHARMACY | Facility: CLINIC | Age: 74
End: 2024-07-08
Payer: COMMERCIAL

## 2024-07-08 ENCOUNTER — DOCUMENTATION (OUTPATIENT)
Dept: HOME HEALTH SERVICES | Facility: HOME HEALTH | Age: 74
End: 2024-07-08

## 2024-07-08 ENCOUNTER — ANESTHESIA EVENT (OUTPATIENT)
Dept: OPERATING ROOM | Facility: HOSPITAL | Age: 74
End: 2024-07-08
Payer: MEDICARE

## 2024-07-08 ENCOUNTER — APPOINTMENT (OUTPATIENT)
Dept: RADIOLOGY | Facility: HOSPITAL | Age: 74
End: 2024-07-08
Payer: MEDICARE

## 2024-07-08 ENCOUNTER — HOSPITAL ENCOUNTER (OUTPATIENT)
Facility: HOSPITAL | Age: 74
Discharge: HOME HEALTH | End: 2024-07-09
Attending: ORTHOPAEDIC SURGERY | Admitting: ORTHOPAEDIC SURGERY
Payer: MEDICARE

## 2024-07-08 ENCOUNTER — HOME HEALTH ADMISSION (OUTPATIENT)
Dept: HOME HEALTH SERVICES | Facility: HOME HEALTH | Age: 74
End: 2024-07-08
Payer: MEDICARE

## 2024-07-08 DIAGNOSIS — Z96.652 STATUS POST TOTAL LEFT KNEE REPLACEMENT: Primary | ICD-10-CM

## 2024-07-08 PROCEDURE — 3600000018 HC OR TIME - INITIAL BASE CHARGE - PROCEDURE LEVEL SIX: Performed by: ORTHOPAEDIC SURGERY

## 2024-07-08 PROCEDURE — 2500000004 HC RX 250 GENERAL PHARMACY W/ HCPCS (ALT 636 FOR OP/ED)

## 2024-07-08 PROCEDURE — 2500000004 HC RX 250 GENERAL PHARMACY W/ HCPCS (ALT 636 FOR OP/ED): Performed by: ANESTHESIOLOGY

## 2024-07-08 PROCEDURE — 94760 N-INVAS EAR/PLS OXIMETRY 1: CPT | Mod: CCI

## 2024-07-08 PROCEDURE — 3700000002 HC GENERAL ANESTHESIA TIME - EACH INCREMENTAL 1 MINUTE: Performed by: ORTHOPAEDIC SURGERY

## 2024-07-08 PROCEDURE — 64447 NJX AA&/STRD FEMORAL NRV IMG: CPT | Performed by: ANESTHESIOLOGY

## 2024-07-08 PROCEDURE — 2500000004 HC RX 250 GENERAL PHARMACY W/ HCPCS (ALT 636 FOR OP/ED): Mod: JZ

## 2024-07-08 PROCEDURE — 7100000002 HC RECOVERY ROOM TIME - EACH INCREMENTAL 1 MINUTE: Performed by: ORTHOPAEDIC SURGERY

## 2024-07-08 PROCEDURE — 2500000004 HC RX 250 GENERAL PHARMACY W/ HCPCS (ALT 636 FOR OP/ED): Performed by: ORTHOPAEDIC SURGERY

## 2024-07-08 PROCEDURE — A27447 PR TOTAL KNEE ARTHROPLASTY: Performed by: ANESTHESIOLOGY

## 2024-07-08 PROCEDURE — A4649 SURGICAL SUPPLIES: HCPCS | Performed by: ORTHOPAEDIC SURGERY

## 2024-07-08 PROCEDURE — 97116 GAIT TRAINING THERAPY: CPT | Mod: GP

## 2024-07-08 PROCEDURE — 2500000004 HC RX 250 GENERAL PHARMACY W/ HCPCS (ALT 636 FOR OP/ED): Performed by: ANESTHESIOLOGIST ASSISTANT

## 2024-07-08 PROCEDURE — 97110 THERAPEUTIC EXERCISES: CPT | Mod: GP

## 2024-07-08 PROCEDURE — 3600000017 HC OR TIME - EACH INCREMENTAL 1 MINUTE - PROCEDURE LEVEL SIX: Performed by: ORTHOPAEDIC SURGERY

## 2024-07-08 PROCEDURE — 73560 X-RAY EXAM OF KNEE 1 OR 2: CPT | Mod: LT

## 2024-07-08 PROCEDURE — 97161 PT EVAL LOW COMPLEX 20 MIN: CPT | Mod: GP

## 2024-07-08 PROCEDURE — C1776 JOINT DEVICE (IMPLANTABLE): HCPCS | Performed by: ORTHOPAEDIC SURGERY

## 2024-07-08 PROCEDURE — 76942 ECHO GUIDE FOR BIOPSY: CPT | Performed by: ANESTHESIOLOGY

## 2024-07-08 PROCEDURE — 99100 ANES PT EXTEME AGE<1 YR&>70: CPT | Performed by: ANESTHESIOLOGY

## 2024-07-08 PROCEDURE — RXMED WILLOW AMBULATORY MEDICATION CHARGE

## 2024-07-08 PROCEDURE — 2500000001 HC RX 250 WO HCPCS SELF ADMINISTERED DRUGS (ALT 637 FOR MEDICARE OP)

## 2024-07-08 PROCEDURE — 2720000007 HC OR 272 NO HCPCS: Performed by: ORTHOPAEDIC SURGERY

## 2024-07-08 PROCEDURE — 2500000005 HC RX 250 GENERAL PHARMACY W/O HCPCS: Performed by: ANESTHESIOLOGIST ASSISTANT

## 2024-07-08 PROCEDURE — 7100000011 HC EXTENDED STAY RECOVERY HOURLY - NURSING UNIT

## 2024-07-08 PROCEDURE — 2780000003 HC OR 278 NO HCPCS: Performed by: ORTHOPAEDIC SURGERY

## 2024-07-08 PROCEDURE — 73560 X-RAY EXAM OF KNEE 1 OR 2: CPT | Mod: LEFT SIDE | Performed by: RADIOLOGY

## 2024-07-08 PROCEDURE — 2500000005 HC RX 250 GENERAL PHARMACY W/O HCPCS

## 2024-07-08 PROCEDURE — 64450 NJX AA&/STRD OTHER PN/BRANCH: CPT | Performed by: ANESTHESIOLOGY

## 2024-07-08 PROCEDURE — 7100000001 HC RECOVERY ROOM TIME - INITIAL BASE CHARGE: Performed by: ORTHOPAEDIC SURGERY

## 2024-07-08 PROCEDURE — 3700000001 HC GENERAL ANESTHESIA TIME - INITIAL BASE CHARGE: Performed by: ORTHOPAEDIC SURGERY

## 2024-07-08 PROCEDURE — C1713 ANCHOR/SCREW BN/BN,TIS/BN: HCPCS | Performed by: ORTHOPAEDIC SURGERY

## 2024-07-08 PROCEDURE — A27447 PR TOTAL KNEE ARTHROPLASTY: Performed by: ANESTHESIOLOGIST ASSISTANT

## 2024-07-08 DEVICE — TOTAL STABILIZER+ TIBIAL INSERT
Type: IMPLANTABLE DEVICE | Site: KNEE | Status: FUNCTIONAL
Brand: TRIATHLON

## 2024-07-08 DEVICE — DISPOSABLE FLUTED HEADLESS PINS
Type: IMPLANTABLE DEVICE | Site: KNEE | Status: FUNCTIONAL
Brand: INSTRUMENT

## 2024-07-08 DEVICE — POSTERIOR STABILIZED FEMORAL
Type: IMPLANTABLE DEVICE | Site: KNEE | Status: FUNCTIONAL
Brand: TRIATHLON

## 2024-07-08 DEVICE — UNIVERSAL TIBIAL BASEPLATE
Type: IMPLANTABLE DEVICE | Site: KNEE | Status: FUNCTIONAL
Brand: TRIATHLON

## 2024-07-08 DEVICE — ASYMMETRIC PATELLA
Type: IMPLANTABLE DEVICE | Site: KNEE | Status: FUNCTIONAL
Brand: TRIATHLON

## 2024-07-08 DEVICE — SIMPLEX® HV IS A FAST-SETTING ACRYLIC RESIN FOR USE IN BONE SURGERY. MIXING THE TWO SEPARATE STERILE COMPONENTS PRODUCES A DUCTILE BONE CEMENT WHICH, AFTER HARDENING, FIXES THE IMPLANT AND TRANSFERS STRESSES PRODUCED DURING MOVEMENT EVENLY TO THE BONE. SIMPLEX® HV CEMENT POWDER ALSO CONTAINS INSOLUBLE ZIRCONIUM DIOXIDE AS AN X-RAY CONTRAST MEDIUM. SIMPLEX® HV DOES NOT EMIT A SIGNAL AND DOES NOT POSE A SAFETY RISK IN A MAGNETIC RESONANCE ENVIRONMENT.
Type: IMPLANTABLE DEVICE | Site: KNEE | Status: FUNCTIONAL
Brand: SIMPLEX HV

## 2024-07-08 RX ORDER — FENTANYL CITRATE 50 UG/ML
INJECTION, SOLUTION INTRAMUSCULAR; INTRAVENOUS AS NEEDED
Status: DISCONTINUED | OUTPATIENT
Start: 2024-07-08 | End: 2024-07-08

## 2024-07-08 RX ORDER — BUPIVACAINE HYDROCHLORIDE 7.5 MG/ML
INJECTION, SOLUTION EPIDURAL; RETROBULBAR AS NEEDED
Status: DISCONTINUED | OUTPATIENT
Start: 2024-07-08 | End: 2024-07-08

## 2024-07-08 RX ORDER — ALPRAZOLAM 0.5 MG/1
0.5 TABLET ORAL NIGHTLY PRN
Status: DISCONTINUED | OUTPATIENT
Start: 2024-07-08 | End: 2024-07-09 | Stop reason: HOSPADM

## 2024-07-08 RX ORDER — SODIUM CHLORIDE, SODIUM LACTATE, POTASSIUM CHLORIDE, CALCIUM CHLORIDE 600; 310; 30; 20 MG/100ML; MG/100ML; MG/100ML; MG/100ML
100 INJECTION, SOLUTION INTRAVENOUS CONTINUOUS
Status: DISCONTINUED | OUTPATIENT
Start: 2024-07-08 | End: 2024-07-09 | Stop reason: HOSPADM

## 2024-07-08 RX ORDER — TRANEXAMIC ACID 100 MG/ML
INJECTION, SOLUTION INTRAVENOUS AS NEEDED
Status: DISCONTINUED | OUTPATIENT
Start: 2024-07-08 | End: 2024-07-08

## 2024-07-08 RX ORDER — CEFAZOLIN SODIUM 1 G/50ML
1 SOLUTION INTRAVENOUS ONCE
Status: DISCONTINUED | OUTPATIENT
Start: 2024-07-08 | End: 2024-07-08 | Stop reason: DRUGHIGH

## 2024-07-08 RX ORDER — PANTOPRAZOLE SODIUM 20 MG/1
20 TABLET, DELAYED RELEASE ORAL DAILY
Status: DISCONTINUED | OUTPATIENT
Start: 2024-07-08 | End: 2024-07-09 | Stop reason: HOSPADM

## 2024-07-08 RX ORDER — LOSARTAN POTASSIUM 25 MG/1
50 TABLET ORAL DAILY
Status: DISCONTINUED | OUTPATIENT
Start: 2024-07-09 | End: 2024-07-09 | Stop reason: HOSPADM

## 2024-07-08 RX ORDER — OXYCODONE AND ACETAMINOPHEN 5; 325 MG/1; MG/1
1 TABLET ORAL EVERY 6 HOURS PRN
Qty: 28 TABLET | Refills: 0 | Status: SHIPPED | OUTPATIENT
Start: 2024-07-08

## 2024-07-08 RX ORDER — ROSUVASTATIN CALCIUM 10 MG/1
10 TABLET, COATED ORAL DAILY
Status: DISCONTINUED | OUTPATIENT
Start: 2024-07-08 | End: 2024-07-08

## 2024-07-08 RX ORDER — LABETALOL HYDROCHLORIDE 5 MG/ML
5 INJECTION, SOLUTION INTRAVENOUS ONCE AS NEEDED
Status: DISCONTINUED | OUTPATIENT
Start: 2024-07-08 | End: 2024-07-08 | Stop reason: HOSPADM

## 2024-07-08 RX ORDER — CEFAZOLIN SODIUM 2 G/100ML
2 INJECTION, SOLUTION INTRAVENOUS EVERY 8 HOURS
Status: DISCONTINUED | OUTPATIENT
Start: 2024-07-08 | End: 2024-07-08

## 2024-07-08 RX ORDER — GABAPENTIN 300 MG/1
300 CAPSULE ORAL 3 TIMES DAILY PRN
Qty: 42 CAPSULE | Refills: 0 | Status: SHIPPED | OUTPATIENT
Start: 2024-07-08

## 2024-07-08 RX ORDER — CARISOPRODOL 350 MG/1
350 TABLET ORAL 3 TIMES DAILY PRN
Status: DISCONTINUED | OUTPATIENT
Start: 2024-07-08 | End: 2024-07-09 | Stop reason: HOSPADM

## 2024-07-08 RX ORDER — TALC
3 POWDER (GRAM) TOPICAL NIGHTLY PRN
Status: DISCONTINUED | OUTPATIENT
Start: 2024-07-08 | End: 2024-07-09 | Stop reason: HOSPADM

## 2024-07-08 RX ORDER — LOSARTAN POTASSIUM 25 MG/1
50 TABLET ORAL DAILY
Status: DISCONTINUED | OUTPATIENT
Start: 2024-07-08 | End: 2024-07-08

## 2024-07-08 RX ORDER — ASCORBIC ACID 500 MG
500 TABLET ORAL 2 TIMES DAILY
Qty: 60 TABLET | Refills: 0 | Status: SHIPPED | OUTPATIENT
Start: 2024-07-08 | End: 2024-08-07

## 2024-07-08 RX ORDER — OXYCODONE HYDROCHLORIDE 5 MG/1
5 TABLET ORAL EVERY 6 HOURS PRN
Status: DISCONTINUED | OUTPATIENT
Start: 2024-07-08 | End: 2024-07-09 | Stop reason: HOSPADM

## 2024-07-08 RX ORDER — CEFAZOLIN SODIUM 2 G/100ML
2 INJECTION, SOLUTION INTRAVENOUS ONCE
Status: COMPLETED | OUTPATIENT
Start: 2024-07-08 | End: 2024-07-08

## 2024-07-08 RX ORDER — ONDANSETRON HYDROCHLORIDE 2 MG/ML
4 INJECTION, SOLUTION INTRAVENOUS ONCE AS NEEDED
Status: DISCONTINUED | OUTPATIENT
Start: 2024-07-08 | End: 2024-07-08 | Stop reason: HOSPADM

## 2024-07-08 RX ORDER — HYDROMORPHONE HYDROCHLORIDE 0.2 MG/ML
0.2 INJECTION INTRAMUSCULAR; INTRAVENOUS; SUBCUTANEOUS EVERY 5 MIN PRN
Status: DISCONTINUED | OUTPATIENT
Start: 2024-07-08 | End: 2024-07-08 | Stop reason: HOSPADM

## 2024-07-08 RX ORDER — MORPHINE SULFATE 2 MG/ML
2 INJECTION, SOLUTION INTRAMUSCULAR; INTRAVENOUS
Status: DISCONTINUED | OUTPATIENT
Start: 2024-07-08 | End: 2024-07-09 | Stop reason: HOSPADM

## 2024-07-08 RX ORDER — FLUOXETINE 10 MG/1
10 CAPSULE ORAL DAILY
Status: DISCONTINUED | OUTPATIENT
Start: 2024-07-08 | End: 2024-07-09 | Stop reason: HOSPADM

## 2024-07-08 RX ORDER — ONDANSETRON 4 MG/1
4 TABLET, ORALLY DISINTEGRATING ORAL EVERY 8 HOURS PRN
Status: DISCONTINUED | OUTPATIENT
Start: 2024-07-08 | End: 2024-07-09 | Stop reason: HOSPADM

## 2024-07-08 RX ORDER — ASPIRIN 325 MG
325 TABLET ORAL 2 TIMES DAILY
Qty: 60 TABLET | Refills: 0 | Status: SHIPPED | OUTPATIENT
Start: 2024-07-08

## 2024-07-08 RX ORDER — TRAMADOL HYDROCHLORIDE 50 MG/1
50 TABLET ORAL EVERY 8 HOURS PRN
Status: DISCONTINUED | OUTPATIENT
Start: 2024-07-08 | End: 2024-07-09 | Stop reason: HOSPADM

## 2024-07-08 RX ORDER — DOCUSATE SODIUM 100 MG/1
100 CAPSULE, LIQUID FILLED ORAL 2 TIMES DAILY PRN
Qty: 60 CAPSULE | Refills: 0 | Status: SHIPPED | OUTPATIENT
Start: 2024-07-08

## 2024-07-08 RX ORDER — NALOXONE HYDROCHLORIDE 0.4 MG/ML
0.2 INJECTION, SOLUTION INTRAMUSCULAR; INTRAVENOUS; SUBCUTANEOUS EVERY 5 MIN PRN
Status: DISCONTINUED | OUTPATIENT
Start: 2024-07-08 | End: 2024-07-09 | Stop reason: HOSPADM

## 2024-07-08 RX ORDER — MIDAZOLAM HYDROCHLORIDE 1 MG/ML
2 INJECTION, SOLUTION INTRAMUSCULAR; INTRAVENOUS ONCE
Status: COMPLETED | OUTPATIENT
Start: 2024-07-08 | End: 2024-07-08

## 2024-07-08 RX ORDER — ACETAMINOPHEN 325 MG/1
975 TABLET ORAL EVERY 6 HOURS SCHEDULED
Status: DISCONTINUED | OUTPATIENT
Start: 2024-07-08 | End: 2024-07-09 | Stop reason: HOSPADM

## 2024-07-08 RX ORDER — OXYCODONE HYDROCHLORIDE 5 MG/1
10 TABLET ORAL EVERY 6 HOURS PRN
Status: DISCONTINUED | OUTPATIENT
Start: 2024-07-08 | End: 2024-07-09 | Stop reason: HOSPADM

## 2024-07-08 RX ORDER — PROPOFOL 10 MG/ML
INJECTION, EMULSION INTRAVENOUS CONTINUOUS PRN
Status: DISCONTINUED | OUTPATIENT
Start: 2024-07-08 | End: 2024-07-08

## 2024-07-08 RX ORDER — MELOXICAM 7.5 MG/1
7.5 TABLET ORAL 2 TIMES DAILY PRN
Qty: 60 TABLET | Refills: 0 | Status: SHIPPED | OUTPATIENT
Start: 2024-07-08 | End: 2024-08-07

## 2024-07-08 RX ORDER — LIDOCAINE HYDROCHLORIDE 10 MG/ML
INJECTION, SOLUTION INTRAVENOUS AS NEEDED
Status: DISCONTINUED | OUTPATIENT
Start: 2024-07-08 | End: 2024-07-08

## 2024-07-08 RX ORDER — IPRATROPIUM BROMIDE 0.5 MG/2.5ML
500 SOLUTION RESPIRATORY (INHALATION) ONCE AS NEEDED
Status: DISCONTINUED | OUTPATIENT
Start: 2024-07-08 | End: 2024-07-08 | Stop reason: HOSPADM

## 2024-07-08 RX ORDER — GABAPENTIN 300 MG/1
300 CAPSULE ORAL 3 TIMES DAILY PRN
Status: DISCONTINUED | OUTPATIENT
Start: 2024-07-08 | End: 2024-07-09 | Stop reason: HOSPADM

## 2024-07-08 RX ORDER — CEFAZOLIN SODIUM 2 G/100ML
2 INJECTION, SOLUTION INTRAVENOUS EVERY 8 HOURS
Status: COMPLETED | OUTPATIENT
Start: 2024-07-08 | End: 2024-07-09

## 2024-07-08 RX ORDER — KETOROLAC TROMETHAMINE 15 MG/ML
15 INJECTION, SOLUTION INTRAMUSCULAR; INTRAVENOUS EVERY 6 HOURS
Status: COMPLETED | OUTPATIENT
Start: 2024-07-08 | End: 2024-07-09

## 2024-07-08 RX ORDER — MEPERIDINE HYDROCHLORIDE 25 MG/ML
12.5 INJECTION INTRAMUSCULAR; INTRAVENOUS; SUBCUTANEOUS EVERY 10 MIN PRN
Status: DISCONTINUED | OUTPATIENT
Start: 2024-07-08 | End: 2024-07-08 | Stop reason: HOSPADM

## 2024-07-08 RX ORDER — ONDANSETRON HYDROCHLORIDE 2 MG/ML
4 INJECTION, SOLUTION INTRAVENOUS EVERY 8 HOURS PRN
Status: DISCONTINUED | OUTPATIENT
Start: 2024-07-08 | End: 2024-07-09 | Stop reason: HOSPADM

## 2024-07-08 RX ORDER — ROPIVACAINE/EPI/CLONIDINE/KET 2.46-0.005
SYRINGE (ML) INJECTION AS NEEDED
Status: DISCONTINUED | OUTPATIENT
Start: 2024-07-08 | End: 2024-07-08 | Stop reason: HOSPADM

## 2024-07-08 RX ORDER — ONDANSETRON HYDROCHLORIDE 2 MG/ML
INJECTION, SOLUTION INTRAVENOUS AS NEEDED
Status: DISCONTINUED | OUTPATIENT
Start: 2024-07-08 | End: 2024-07-08

## 2024-07-08 RX ORDER — SIMETHICONE 80 MG
80 TABLET,CHEWABLE ORAL 4 TIMES DAILY PRN
Status: DISCONTINUED | OUTPATIENT
Start: 2024-07-08 | End: 2024-07-09 | Stop reason: HOSPADM

## 2024-07-08 RX ORDER — DOCUSATE SODIUM 100 MG/1
100 CAPSULE, LIQUID FILLED ORAL 2 TIMES DAILY
Status: DISCONTINUED | OUTPATIENT
Start: 2024-07-08 | End: 2024-07-08

## 2024-07-08 RX ORDER — SODIUM CHLORIDE, SODIUM LACTATE, POTASSIUM CHLORIDE, CALCIUM CHLORIDE 600; 310; 30; 20 MG/100ML; MG/100ML; MG/100ML; MG/100ML
100 INJECTION, SOLUTION INTRAVENOUS CONTINUOUS
Status: DISCONTINUED | OUTPATIENT
Start: 2024-07-08 | End: 2024-07-08 | Stop reason: HOSPADM

## 2024-07-08 RX ORDER — ALBUTEROL SULFATE 0.83 MG/ML
2.5 SOLUTION RESPIRATORY (INHALATION) ONCE AS NEEDED
Status: DISCONTINUED | OUTPATIENT
Start: 2024-07-08 | End: 2024-07-08 | Stop reason: HOSPADM

## 2024-07-08 RX ORDER — FENTANYL CITRATE 50 UG/ML
50 INJECTION, SOLUTION INTRAMUSCULAR; INTRAVENOUS ONCE
Status: COMPLETED | OUTPATIENT
Start: 2024-07-08 | End: 2024-07-08

## 2024-07-08 RX ORDER — ROPIVACAINE HYDROCHLORIDE 5 MG/ML
INJECTION, SOLUTION EPIDURAL; INFILTRATION; PERINEURAL AS NEEDED
Status: DISCONTINUED | OUTPATIENT
Start: 2024-07-08 | End: 2024-07-08

## 2024-07-08 RX ORDER — ATORVASTATIN CALCIUM 20 MG/1
20 TABLET, FILM COATED ORAL NIGHTLY
Status: DISCONTINUED | OUTPATIENT
Start: 2024-07-08 | End: 2024-07-09 | Stop reason: HOSPADM

## 2024-07-08 RX ORDER — CARISOPRODOL 350 MG/1
350 TABLET ORAL 3 TIMES DAILY PRN
Qty: 42 TABLET | Refills: 0 | Status: SHIPPED | OUTPATIENT
Start: 2024-07-08

## 2024-07-08 RX ORDER — ASPIRIN 325 MG
325 TABLET, DELAYED RELEASE (ENTERIC COATED) ORAL 2 TIMES DAILY
Status: DISCONTINUED | OUTPATIENT
Start: 2024-07-09 | End: 2024-07-09 | Stop reason: HOSPADM

## 2024-07-08 RX ORDER — DIPHENHYDRAMINE HYDROCHLORIDE 50 MG/ML
12.5 INJECTION INTRAMUSCULAR; INTRAVENOUS EVERY 6 HOURS PRN
Status: DISCONTINUED | OUTPATIENT
Start: 2024-07-08 | End: 2024-07-09 | Stop reason: HOSPADM

## 2024-07-08 SDOH — HEALTH STABILITY: MENTAL HEALTH: CURRENT SMOKER: 0

## 2024-07-08 SDOH — SOCIAL STABILITY: SOCIAL INSECURITY
WITHIN THE LAST YEAR, HAVE TO BEEN RAPED OR FORCED TO HAVE ANY KIND OF SEXUAL ACTIVITY BY YOUR PARTNER OR EX-PARTNER?: NO

## 2024-07-08 SDOH — ECONOMIC STABILITY: INCOME INSECURITY: IN THE PAST 12 MONTHS, HAS THE ELECTRIC, GAS, OIL, OR WATER COMPANY THREATENED TO SHUT OFF SERVICE IN YOUR HOME?: NO

## 2024-07-08 SDOH — HEALTH STABILITY: MENTAL HEALTH
HOW OFTEN DO YOU NEED TO HAVE SOMEONE HELP YOU WHEN YOU READ INSTRUCTIONS, PAMPHLETS, OR OTHER WRITTEN MATERIAL FROM YOUR DOCTOR OR PHARMACY?: NEVER

## 2024-07-08 SDOH — HEALTH STABILITY: MENTAL HEALTH: HOW OFTEN DO YOU HAVE A DRINK CONTAINING ALCOHOL?: NEVER

## 2024-07-08 SDOH — SOCIAL STABILITY: SOCIAL INSECURITY: HAVE YOU HAD ANY THOUGHTS OF HARMING ANYONE ELSE?: NO

## 2024-07-08 SDOH — SOCIAL STABILITY: SOCIAL INSECURITY: HAS ANYONE EVER THREATENED TO HURT YOUR FAMILY OR YOUR PETS?: NO

## 2024-07-08 SDOH — ECONOMIC STABILITY: INCOME INSECURITY: IN THE LAST 12 MONTHS, WAS THERE A TIME WHEN YOU WERE NOT ABLE TO PAY THE MORTGAGE OR RENT ON TIME?: NO

## 2024-07-08 SDOH — SOCIAL STABILITY: SOCIAL NETWORK
DO YOU BELONG TO ANY CLUBS OR ORGANIZATIONS SUCH AS CHURCH GROUPS UNIONS, FRATERNAL OR ATHLETIC GROUPS, OR SCHOOL GROUPS?: YES

## 2024-07-08 SDOH — SOCIAL STABILITY: SOCIAL INSECURITY: DO YOU FEEL ANYONE HAS EXPLOITED OR TAKEN ADVANTAGE OF YOU FINANCIALLY OR OF YOUR PERSONAL PROPERTY?: NO

## 2024-07-08 SDOH — HEALTH STABILITY: MENTAL HEALTH
STRESS IS WHEN SOMEONE FEELS TENSE, NERVOUS, ANXIOUS, OR CAN'T SLEEP AT NIGHT BECAUSE THEIR MIND IS TROUBLED. HOW STRESSED ARE YOU?: TO SOME EXTENT

## 2024-07-08 SDOH — SOCIAL STABILITY: SOCIAL INSECURITY
WITHIN THE LAST YEAR, HAVE YOU BEEN KICKED, HIT, SLAPPED, OR OTHERWISE PHYSICALLY HURT BY YOUR PARTNER OR EX-PARTNER?: NO

## 2024-07-08 SDOH — SOCIAL STABILITY: SOCIAL NETWORK: ARE YOU MARRIED, WIDOWED, DIVORCED, SEPARATED, NEVER MARRIED, OR LIVING WITH A PARTNER?: WIDOWED

## 2024-07-08 SDOH — SOCIAL STABILITY: SOCIAL INSECURITY: ABUSE: ADULT

## 2024-07-08 SDOH — ECONOMIC STABILITY: INCOME INSECURITY: HOW HARD IS IT FOR YOU TO PAY FOR THE VERY BASICS LIKE FOOD, HOUSING, MEDICAL CARE, AND HEATING?: NOT VERY HARD

## 2024-07-08 SDOH — SOCIAL STABILITY: SOCIAL INSECURITY: WITHIN THE LAST YEAR, HAVE YOU BEEN HUMILIATED OR EMOTIONALLY ABUSED IN OTHER WAYS BY YOUR PARTNER OR EX-PARTNER?: NO

## 2024-07-08 SDOH — SOCIAL STABILITY: SOCIAL NETWORK
IN A TYPICAL WEEK, HOW MANY TIMES DO YOU TALK ON THE PHONE WITH FAMILY, FRIENDS, OR NEIGHBORS?: MORE THAN THREE TIMES A WEEK

## 2024-07-08 SDOH — SOCIAL STABILITY: SOCIAL INSECURITY: WITHIN THE LAST YEAR, HAVE YOU BEEN AFRAID OF YOUR PARTNER OR EX-PARTNER?: NO

## 2024-07-08 SDOH — SOCIAL STABILITY: SOCIAL INSECURITY: DO YOU FEEL UNSAFE GOING BACK TO THE PLACE WHERE YOU ARE LIVING?: NO

## 2024-07-08 SDOH — SOCIAL STABILITY: SOCIAL INSECURITY: ARE YOU OR HAVE YOU BEEN THREATENED OR ABUSED PHYSICALLY, EMOTIONALLY, OR SEXUALLY BY ANYONE?: NO

## 2024-07-08 SDOH — HEALTH STABILITY: MENTAL HEALTH: HOW OFTEN DO YOU HAVE 6 OR MORE DRINKS ON ONE OCCASION?: NEVER

## 2024-07-08 SDOH — SOCIAL STABILITY: SOCIAL INSECURITY: DOES ANYONE TRY TO KEEP YOU FROM HAVING/CONTACTING OTHER FRIENDS OR DOING THINGS OUTSIDE YOUR HOME?: NO

## 2024-07-08 SDOH — SOCIAL STABILITY: SOCIAL NETWORK: HOW OFTEN DO YOU GET TOGETHER WITH FRIENDS OR RELATIVES?: MORE THAN THREE TIMES A WEEK

## 2024-07-08 SDOH — ECONOMIC STABILITY: TRANSPORTATION INSECURITY
IN THE PAST 12 MONTHS, HAS THE LACK OF TRANSPORTATION KEPT YOU FROM MEDICAL APPOINTMENTS OR FROM GETTING MEDICATIONS?: NO

## 2024-07-08 SDOH — HEALTH STABILITY: MENTAL HEALTH: HOW MANY STANDARD DRINKS CONTAINING ALCOHOL DO YOU HAVE ON A TYPICAL DAY?: PATIENT DOES NOT DRINK

## 2024-07-08 SDOH — ECONOMIC STABILITY: HOUSING INSECURITY: IN THE LAST 12 MONTHS, HOW MANY PLACES HAVE YOU LIVED?: 1

## 2024-07-08 SDOH — ECONOMIC STABILITY: HOUSING INSECURITY
IN THE LAST 12 MONTHS, WAS THERE A TIME WHEN YOU DID NOT HAVE A STEADY PLACE TO SLEEP OR SLEPT IN A SHELTER (INCLUDING NOW)?: NO

## 2024-07-08 SDOH — ECONOMIC STABILITY: TRANSPORTATION INSECURITY
IN THE PAST 12 MONTHS, HAS LACK OF TRANSPORTATION KEPT YOU FROM MEETINGS, WORK, OR FROM GETTING THINGS NEEDED FOR DAILY LIVING?: NO

## 2024-07-08 SDOH — SOCIAL STABILITY: SOCIAL NETWORK: HOW OFTEN DO YOU ATTENT MEETINGS OF THE CLUB OR ORGANIZATION YOU BELONG TO?: 1 TO 4 TIMES PER YEAR

## 2024-07-08 SDOH — SOCIAL STABILITY: SOCIAL INSECURITY: HAVE YOU HAD THOUGHTS OF HARMING ANYONE ELSE?: NO

## 2024-07-08 SDOH — SOCIAL STABILITY: SOCIAL NETWORK: HOW OFTEN DO YOU ATTEND CHURCH OR RELIGIOUS SERVICES?: 1 TO 4 TIMES PER YEAR

## 2024-07-08 SDOH — SOCIAL STABILITY: SOCIAL INSECURITY: WERE YOU ABLE TO COMPLETE ALL THE BEHAVIORAL HEALTH SCREENINGS?: YES

## 2024-07-08 SDOH — SOCIAL STABILITY: SOCIAL INSECURITY: ARE THERE ANY APPARENT SIGNS OF INJURIES/BEHAVIORS THAT COULD BE RELATED TO ABUSE/NEGLECT?: NO

## 2024-07-08 ASSESSMENT — COGNITIVE AND FUNCTIONAL STATUS - GENERAL
HELP NEEDED FOR BATHING: A LITTLE
CLIMB 3 TO 5 STEPS WITH RAILING: A LITTLE
PATIENT BASELINE BEDBOUND: NO
DRESSING REGULAR UPPER BODY CLOTHING: A LITTLE
DRESSING REGULAR LOWER BODY CLOTHING: A LITTLE
CLIMB 3 TO 5 STEPS WITH RAILING: A LITTLE
MOVING TO AND FROM BED TO CHAIR: A LITTLE
WALKING IN HOSPITAL ROOM: A LITTLE
MOBILITY SCORE: 20
STANDING UP FROM CHAIR USING ARMS: A LITTLE
TOILETING: A LITTLE
DAILY ACTIVITIY SCORE: 20
WALKING IN HOSPITAL ROOM: A LITTLE
MOVING TO AND FROM BED TO CHAIR: A LITTLE
MOBILITY SCORE: 20
STANDING UP FROM CHAIR USING ARMS: A LITTLE

## 2024-07-08 ASSESSMENT — PAIN - FUNCTIONAL ASSESSMENT
PAIN_FUNCTIONAL_ASSESSMENT: 0-10
PAIN_FUNCTIONAL_ASSESSMENT: WONG-BAKER FACES
PAIN_FUNCTIONAL_ASSESSMENT: 0-10
PAIN_FUNCTIONAL_ASSESSMENT: 0-10
PAIN_FUNCTIONAL_ASSESSMENT: FLACC (FACE, LEGS, ACTIVITY, CRY, CONSOLABILITY)

## 2024-07-08 ASSESSMENT — LIFESTYLE VARIABLES
AUDIT-C TOTAL SCORE: 0
SKIP TO QUESTIONS 9-10: 1
HOW MANY STANDARD DRINKS CONTAINING ALCOHOL DO YOU HAVE ON A TYPICAL DAY: PATIENT DOES NOT DRINK
AUDIT-C TOTAL SCORE: 0
SKIP TO QUESTIONS 9-10: 1
HOW OFTEN DO YOU HAVE 6 OR MORE DRINKS ON ONE OCCASION: NEVER
HOW OFTEN DO YOU HAVE A DRINK CONTAINING ALCOHOL: NEVER
AUDIT-C TOTAL SCORE: 0

## 2024-07-08 ASSESSMENT — PAIN DESCRIPTION - DESCRIPTORS: DESCRIPTORS: ACHING

## 2024-07-08 ASSESSMENT — PAIN SCALES - GENERAL
PAINLEVEL_OUTOF10: 3
PAINLEVEL_OUTOF10: 5 - MODERATE PAIN
PAINLEVEL_OUTOF10: 0 - NO PAIN
PAINLEVEL_OUTOF10: 0 - NO PAIN
PAINLEVEL_OUTOF10: 3
PAIN_LEVEL: 0
PAINLEVEL_OUTOF10: 0 - NO PAIN
PAINLEVEL_OUTOF10: 2
PAINLEVEL_OUTOF10: 5 - MODERATE PAIN
PAINLEVEL_OUTOF10: 5 - MODERATE PAIN
PAINLEVEL_OUTOF10: 3
PAINLEVEL_OUTOF10: 2

## 2024-07-08 ASSESSMENT — ACTIVITIES OF DAILY LIVING (ADL)
ADL_ASSISTANCE: INDEPENDENT
DRESSING YOURSELF: INDEPENDENT
WALKS IN HOME: INDEPENDENT
LACK_OF_TRANSPORTATION: NO
JUDGMENT_ADEQUATE_SAFELY_COMPLETE_DAILY_ACTIVITIES: YES
TOILETING: NEEDS ASSISTANCE
BATHING: INDEPENDENT
ADEQUATE_TO_COMPLETE_ADL: YES
HEARING - RIGHT EAR: FUNCTIONAL
ASSISTIVE_DEVICE: WALKER
GROOMING: INDEPENDENT
PATIENT'S MEMORY ADEQUATE TO SAFELY COMPLETE DAILY ACTIVITIES?: YES
HEARING - LEFT EAR: FUNCTIONAL
FEEDING YOURSELF: INDEPENDENT

## 2024-07-08 ASSESSMENT — COLUMBIA-SUICIDE SEVERITY RATING SCALE - C-SSRS
2. HAVE YOU ACTUALLY HAD ANY THOUGHTS OF KILLING YOURSELF?: NO
1. IN THE PAST MONTH, HAVE YOU WISHED YOU WERE DEAD OR WISHED YOU COULD GO TO SLEEP AND NOT WAKE UP?: NO
6. HAVE YOU EVER DONE ANYTHING, STARTED TO DO ANYTHING, OR PREPARED TO DO ANYTHING TO END YOUR LIFE?: NO

## 2024-07-08 ASSESSMENT — PATIENT HEALTH QUESTIONNAIRE - PHQ9
SUM OF ALL RESPONSES TO PHQ9 QUESTIONS 1 & 2: 0
2. FEELING DOWN, DEPRESSED OR HOPELESS: NOT AT ALL
1. LITTLE INTEREST OR PLEASURE IN DOING THINGS: NOT AT ALL

## 2024-07-08 ASSESSMENT — PAIN SCALES - WONG BAKER: WONGBAKER_NUMERICALRESPONSE: HURTS LITTLE MORE

## 2024-07-08 NOTE — NURSING NOTE
"Blood pressure 105/52, pulse 90, temperature 36 °C (96.8 °F), temperature source Temporal, resp. rate 15, height 1.499 m (4' 11.02\"), weight 65.2 kg (143 lb 11.8 oz), SpO2 99%.   Patient got the floor, vital is stable. Alert by 3. Oriented to room. Call light within reach. Continue monitor.    "

## 2024-07-08 NOTE — ANESTHESIA PREPROCEDURE EVALUATION
Patient: Carmella Shin    Procedure Information       Date/Time: 07/08/24 0930    Procedure: OPEN TOTAL KNEE ARTHROPLASTY (KARMA ROBOTIC ASSISTED DEVICE, ROS TRIATHLON PS FEMUR, UNIVERSAL TIBIAL TRAY, X3 TS POLY & PATELLA) *Overnight* *To be second* (Left: Knee)    Location: [facility] / [facility]    Surgeons: Mynor Angelo, DO            Relevant Problems   Anesthesia (within normal limits)      Cardiac  Moderate risk per Harris.  Cleared for surgery.  Negative cath 2016   (+) Abnormal EKG   (+) Benign essential hypertension   (+) Intermittent chest pain   (+) Left-sided chest wall pain   (+) Localized chest pain   (+) Pure hypercholesterolemia      Pulmonary (within normal limits)      Neuro   (+) Anxiety   (+) Depression   (+) Sciatica of right side      GI   (+) Esophageal reflux      /Renal (within normal limits)      Liver (within normal limits)      Endocrine (within normal limits)      Hematology (within normal limits)      Musculoskeletal (within normal limits)      HEENT   (+) Seasonal allergies      ID (within normal limits)      Skin (within normal limits)      GYN (within normal limits)       Clinical information reviewed:    Allergies              Allergies   Allergen Reactions   • Vancomycin Hives   • Ace Inhibitors Cough   • Bupropion Nausea Only and Tinnitus   • Escitalopram Oxalate Swelling   • Metronidazole Itching   • Penicillins Itching     Pt states last dose about 10 years ago itching   • Sulfa (Sulfonamide Antibiotics) Nausea Only   • Venlafaxine Diarrhea     Prior to Admission medications    Medication Sig Start Date End Date Taking? Authorizing Provider   ALPRAZolam (Xanax) 0.5 mg tablet Take 1 tablet (0.5 mg) by mouth. Take one tablet prior to falling. 4/19/24 9/19/24  Historical Provider, MD   ascorbic acid (Vitamin C) 100 mg tablet Take 1 tablet (100 mg) by mouth once daily. As directed 3/8/16   Historical Provider, MD   azelastine (Astelin) 137 mcg (0.1 %) nasal spray  Administer 2 sprays into each nostril once daily as needed. 12/4/18   Historical Provider, MD   calcium phosphate-vitamin D3 250 mg-12.5 mcg (500 unit) tablet,chewable TAKES TOTAL 1250 3/22/22   Historical Provider, MD   chlorhexidine (Peridex) 0.12 % solution Use 15 mL in the mouth or throat if needed (pre operative 15ml swish and spit the night befor and morning of surgery) for up to 2 doses. 6/20/24   MARGUERITE Munguia-CNP   FLUoxetine (PROzac) 10 mg capsule Take 1 capsule (10 mg) by mouth once daily. 5/4/22   Historical Provider, MD   losartan (Cozaar) 50 mg tablet Take 1 tablet (50 mg) by mouth once daily. 12/20/20   Historical Provider, MD   multivit-iron-minerals-folic acid (Centrum Silver, geriatric,) 0.4 mg-300 mcg- 250 mcg tab Take 1 tablet by mouth once daily. 3/8/16   Historical Provider, MD   pantoprazole (ProtoNix) 20 mg EC tablet TAKE 1 TABLET BY MOUTH EVERY DAY 3/20/23   Delfina Marino APRN-CNP   rosuvastatin (Crestor) 10 mg tablet Take 1 tablet (10 mg) by mouth once daily. 6/21/24 6/21/25  Jerman Harris MD     Past Medical History:   Diagnosis Date   • Allergic contact dermatitis due to plants, except food 09/23/2013    Contact dermatitis due to poison ivy   • Allergy status to unspecified drugs, medicaments and biological substances 09/26/2017    History of allergic drug reaction   • Anxiety    • Benign neoplasm of meninges, unspecified (Multi) 06/17/2013    Meningioma   • Cancer (Multi)    • Chronic rhinitis 09/23/2013    Rhinitis   • Encounter for screening for malignant neoplasm of cervix 11/04/2015    Cervical cancer screening   • GERD (gastroesophageal reflux disease)    • Hypertension    • Impacted cerumen, right ear 09/20/2017    Excessive cerumen in right ear canal   • Other chest pain 03/16/2016    Atypical chest pain   • Personal history of other diseases of the digestive system     History of ulcerative colitis   • Personal history of other diseases of the respiratory system 09/02/2014     History of pharyngitis   • Personal history of other diseases of the respiratory system 2014    History of acute sinusitis   • Personal history of other drug therapy 2017    History of influenza vaccination   • Personal history of other infectious and parasitic diseases     History of varicella   • Personal history of other malignant neoplasm of rectum, rectosigmoid junction, and anus     History of malignant neoplasm of rectum   • Pure hypercholesterolemia, unspecified     Elevated cholesterol     Past Surgical History:   Procedure Laterality Date   • CARDIAC CATHETERIZATION     • CATARACT EXTRACTION  2013    Cataract Surgery   •  SECTION, CLASSIC  2013     Section   • COLECTOMY  2013    Partial Colectomy   • COLECTOMY  2013    Partial Colectomy   • COLON SURGERY  10/06/2017    Colon Surgery   • EYE SURGERY  10/06/2017    Eye Surgery   • FOOT SURGERY Right     bunionectomy   • ILEOSTOMY  2013    Ileostomy   • OTHER SURGICAL HISTORY  2014    Surgically Induced    • OTHER SURGICAL HISTORY  2020    Colectomy total   • TONSILLECTOMY  2013    Tonsillectomy   • TOTAL ABDOMINAL HYSTERECTOMY W/ BILATERAL SALPINGOOPHORECTOMY  2013    Salpingo-oophorectomy Bilateral       NPO Detail:  No data recorded     Physical Exam    Airway  Mallampati: II  TM distance: >3 FB  Neck ROM: full     Cardiovascular - normal exam     Dental - normal exam     Pulmonary - normal exam     Abdominal - normal exam       Other findings: Upper perm bridge        Anesthesia Plan    History of general anesthesia?: yes  History of complications of general anesthesia?: no    ASA 3     regional and spinal   (ACB + IPACK block)  The patient is not a current smoker.  Patient was not previously instructed to abstain from smoking on day of procedure.  Patient did not smoke on day of procedure.  Education provided regarding risk of obstructive sleep  apnea.  intravenous induction   Postoperative administration of opioids is intended.  Anesthetic plan and risks discussed with patient.  Use of blood products discussed with patient who consented to blood products.    Plan discussed with CRNA and CAA.

## 2024-07-08 NOTE — PROGRESS NOTES
TCC met with at the bedside to discuss care/discharge plan.  74 yr old female admitted extended recovery following left knee replacement with Dr. Angelo  Plan is home with her daughter Katalina[id number] [facility] [address] [place] [address] with [facility] PT. Pt would like to be reached on her cell [phone].  SOC pending confirmation.   07/08/24 8722   Discharge Planning   Living Arrangements Alone   Support Systems Children   Assistance Needed mobility, transfers   Type of Residence Private residence   Number of Stairs to Enter Residence 3   Who is requesting discharge planning? Provider   Home or Post Acute Services In home services   Type of Home Care Services Home PT   Patient expects to be discharged to: home with daughter (katalina)   Does the patient need discharge transport arranged? No   Financial Resource Strain   How hard is it for you to pay for the very basics like food, housing, medical care, and heating? Not hard   Housing Stability   In the last 12 months, was there a time when you were not able to pay the mortgage or rent on time? N   In the last 12 months, how many places have you lived? 1   In the last 12 months, was there a time when you did not have a steady place to sleep or slept in a shelter (including now)? N   Transportation Needs   In the past 12 months, has lack of transportation kept you from medical appointments or from getting medications? no   In the past 12 months, has lack of transportation kept you from meetings, work, or from getting things needed for daily living? No   Patient Choice   Provider Choice list and CMS website (https://medicare.gov/care-compare#search) for post-acute Quality and Resource Measure Data were provided and reviewed with: Patient   Patient / Family choosing to utilize agency / facility established prior to hospitalization Yes

## 2024-07-08 NOTE — NURSING NOTE
PT worked with Patient. Patient walked to bathroom with walker. Tolerated well. Patient urinated.

## 2024-07-08 NOTE — DISCHARGE SUMMARY
Discharge Diagnosis  PO L TKA.    Issues Requiring Follow-Up  PO L TKA.    Test Results Pending At Discharge  Pending Labs       No current pending labs.            Hospital Course   The patient is a pleasant 74 year old female who underwent an elective left total knee arthroplasty performed by Dr. Angelo at [facility] on 7/8/2024.  Patient had a routine uncomplicated preoperative, intraoperative and immediate postoperative surgical course.  As planned postoperatively the patient was admitted to the regular nursing floor at [facility].  While in the regular nursing floor patient received consultations from both internal medicine as well as physical therapy.  Patient received preoperative and postoperative intravenous antibiotics.  Pain control is with a combination of both oral and IV narcotic and nonnarcotic medications.  DVT prophylaxis was with sequentials early ambulation and aspirin therapy.  Anticoagulation medications will be continued for minimum of 30 days postsurgery.  Patient was discharged home with home physical therapy and home health care outpatient follow-up is being arranged for 2 weeks in the outpatient clinic postdischarge.      Pertinent Physical Exam At Time of Discharge  Physical Exam  Cardiovascular:      Rate and Rhythm: Normal rate.   Pulmonary:      Breath sounds: Normal breath sounds.   Abdominal:      Palpations: Abdomen is soft.   Musculoskeletal:         General: Swelling and tenderness present.      Left lower leg: Edema present.      Comments: PO bandages appreciated on operative side.   Neurological:      Mental Status: She is alert.         Home Medications     Medication List      START taking these medications     aspirin 325 mg EC tablet; Take 1 tablet (325 mg) by mouth 2 times a day.   carisoprodol 350 mg tablet; Commonly known as: Soma; Take 1 tablet (350   mg) by mouth 3 times a day as needed for muscle spasms.   docusate sodium 100 mg capsule;  Commonly known as: Colace; Take 1   capsule (100 mg) by mouth 2 times a day as needed for constipation.   gabapentin 300 mg capsule; Commonly known as: Neurontin; Take 1 capsule   (300 mg) by mouth 3 times a day as needed (Nerve pain).   meloxicam 7.5 mg tablet; Commonly known as: Mobic; Take 1 tablet (7.5   mg) by mouth 2 times a day as needed for mild pain (1 - 3).   oxyCODONE-acetaminophen 5-325 mg tablet; Commonly known as: Percocet;   Take 1 tablet by mouth every 6 hours if needed for severe pain (7 - 10).     CHANGE how you take these medications     ascorbic acid 500 mg tablet; Commonly known as: Vitamin C; Take 1 tablet   (500 mg) by mouth 2 times a day.; What changed: medication strength, how   much to take, when to take this, additional instructions     CONTINUE taking these medications     ALPRAZolam 0.5 mg tablet; Commonly known as: Xanax   azelastine 137 mcg (0.1 %) nasal spray; Commonly known as: Astelin   calcium phosphate-vitamin D3 250 mg-12.5 mcg (500 unit) tablet,chewable   FLUoxetine 10 mg capsule; Commonly known as: PROzac   losartan 50 mg tablet; Commonly known as: Cozaar   pantoprazole 20 mg EC tablet; Commonly known as: ProtoNix; TAKE 1 TABLET   BY MOUTH EVERY DAY   rosuvastatin 10 mg tablet; Commonly known as: Crestor; Take 1 tablet (10   mg) by mouth once daily.     STOP taking these medications     chlorhexidine 0.12 % solution; Commonly known as: Peridex   multivitamin with minerals iron-free; Commonly known as: Centrum Silver       Outpatient Follow-Up  Future Appointments   Date Time Provider Department Center   8/1/2024  1:45 PM Latanya Dobbs MD [id number] [facility]   2/12/2025  9:15 AM Jerman Harris MD [id number] [facility]   FU at [facility] in 2 weeks, 7/23/2024    Gm Marrero PA-C

## 2024-07-08 NOTE — CARE PLAN
Problem: Balance  Goal: LTG - Patient will maintain standing and sitting balance to allow for completion of daily activities  Outcome: Progressing     Problem: Mobility  Goal: LTG - Patient will ambulate 200 feetx1 at mod I with RW  Outcome: Progressing  Goal: LTG - Patient will navigate 4 steps with railing at Dsx1   Outcome: Progressing     Problem: Safety  Goal: LTG - Patient will demonstrate safety requirements appropriate to situation/environment  Outcome: Progressing     Problem: PT Transfers  Goal: LTG - Patient will transfer from one surface to another at mod I with RW  Outcome: Progressing     Problem: Pain  Goal: LTG - Patient will manage pain with the appropriate technique/Intervention  Outcome: Progressing     Problem: Compromised Skin Integrity  Goal: LTG - Patient will be free from infection  Outcome: Progressing

## 2024-07-08 NOTE — PROGRESS NOTES
Physical Therapy Evaluation & Treatment    Patient Name: Carmella Shin  MRN: [id number]  Today's Date: 7/8/2024   Time Calculation  Start Time: 1448  Stop Time: 1535  Time Calculation (min): 47 min    Assessment/Plan   PT Assessment  PT Assessment Results: Decreased strength, Decreased endurance, Impaired balance, Decreased mobility, Decreased coordination, Impaired sensation, Orthopedic restrictions, Pain  Rehab Prognosis: Good  Evaluation/Treatment Tolerance: Patient tolerated treatment well, Patient limited by fatigue  Medical Staff Made Aware: Yes  Strengths: Ability to acquire knowledge, Access to adaptive/assistive products, Attitude of self, Capable of completing ADLs semi/independent, Coping skills, Housing layout, Insight into problems, Physical health, Premorbid level of function, Support of extended family/friends  Barriers to Participation: Comorbidities  End of Session Communication: Bedside nurse  Assessment Comment: PT barbara low. Pt presenting to [facility] with deficits in functional mobility,balance, strength,a nd endurance s/p L TKA. Pt understanding of HEP and L knee precautions. handout provided. PT recommends HHCPT with assist asneeded  End of Session Patient Position: Up in chair, Alarm on (RN notified of location, ice on L knee, call bell in reach)   IP OR SWING BED PT PLAN  Inpatient or Swing Bed: Inpatient  PT Plan  Treatment/Interventions: Bed mobility, Transfer training, Gait training, Strengthening, Endurance training, Range of motion, Therapeutic exercise, Therapeutic activity, Home exercise program, Positioning  PT Plan: Ongoing PT  PT Frequency: BID  PT Discharge Recommendations: Low intensity level of continued care  Equipment Recommended upon Discharge: Wheeled walker, Straight cane  PT Recommended Transfer Status: Assist x1, Assistive device  PT - OK to Discharge:  (not until stair completion at next session)      Subjective     General Visit Information:  General  Reason for Referral: Pt  [facility] for L TKA by Dr. Angelo.  Past Medical History Relevant to Rehab: anxiety, rectal cancer s/p ileostomy, HTN, GERD, colitis, osteopenia, hiatal hernia  Family/Caregiver Present: No  Prior to Session Communication: Bedside nurse  Patient Position Received: Bed, 3 rail up  General Comment: cleared by RN and agreeable to session. Very fatigued but pleasant and motivated  Home Living:  Home Living  Type of Home: House  Lives With: Alone  Home Adaptive Equipment: Walker rolling or standard (can get cane)  Home Layout: Two level, Able to live on main level with bedroom/bathroom, Stairs to alternate level with rails (she has 1 level condo + basement with a few steps to enter with railing but she will be staying at daughters house which is listed below)  Alternate Level Stairs-Number of Steps: multiple steps with railing per patient  Home Access: Stairs to enter with rails  Entrance Stairs-Number of Steps: 4 steps into house with railing via garage  Prior Level of Function:  Prior Function Per Pt/Caregiver Report  Level of [place]: Independent with ADLs and functional transfers  ADL Assistance: Independent  Homemaking Assistance: Independent  Ambulatory Assistance: Independent  Vocational: Retired  Prior Function Comments: no falls within last6 months  Precautions:  Precautions  LE Weight Bearing Status: Weight Bearing as Tolerated  Medical Precautions: Fall precautions  Post-Surgical Precautions: Left total knee precautions  Vital Signs:       Objective   Pain:  Pain Assessment  Pain Assessment: Ding-Baker FACES  Ding-Baker FACES Pain Rating: Hurts little more  Pain Type: Surgical pain  Pain Location: Knee  Pain Orientation: Left  Pain Radiating Towards: thigh  Pain Interventions: Cold applied, Rest, Elevated, Repositioned  Response to Interventions: continued therapy and RN notified for pain meds  Cognition:  Cognition  Overall Cognitive Status: Within Functional Limits    General  Assessments:  General Observation  General Observation: dressing dry and intact on L knee               Activity Tolerance  Endurance: Tolerates 30 min exercise with multiple rests (post anesthesia effects of lethargy)    Sensation  Light Touch: Partial deficits in the LLE  Sensation Comment: light touch limited on LLE inferior to knee and RLE WFL       Coordination  Movements are Fluid and Coordinated: No  Lower Body Coordination: impaired from post op limitations    Postural Control  Postural Control: Within Functional Limits    Static Sitting Balance  Static Sitting-Balance Support: Bilateral upper extremity supported, Feet supported  Static Sitting-Level of Assistance: Independent  Dynamic Sitting Balance  Dynamic Sitting-Balance Support: Bilateral upper extremity supported, Feet supported  Dynamic Sitting-Comments: distant supervision    Static Standing Balance  Static Standing-Balance Support: Bilateral upper extremity supported  Static Standing-Level of Assistance: Minimum assistance  Static Standing-Comment/Number of Minutes: with RW  Dynamic Standing Balance  Dynamic Standing-Balance Support: Bilateral upper extremity supported  Dynamic Standing-Comments: min A  x1 with RW  Functional Assessments:  Bed Mobility  Bed Mobility: Yes  Bed Mobility 1  Bed Mobility 1: Supine to sitting, Scooting  Level of Assistance 1: Close supervision  Bed Mobility Comments 1: no LOB ; tolerated well with UE support    Transfers  Transfer: Yes  Transfer 1  Technique 1: Sit to stand, Stand to sit  Transfer Device 1: Walker  Transfer Level of Assistance 1: Minimum assistance, Minimal verbal cues  Trials/Comments 1: cueing for hand placement for safety; no buckle or LOB but mild unsteadiness; no dizziness noted. multiple STS with cueing completed    Ambulation/Gait Training  Ambulation/Gait Training Performed: Yes  Ambulation/Gait Training 1  Surface 1: Level tile  Device 1: Rolling walker  Gait Support Devices: Gait  belt  Assistance 1: Minimum assistance, Minimal verbal cues  Quality of Gait 1: Decreased step length, Diminished heel strike, Forward flexed posture, Antalgic, Soft knee(s)  Comments/Distance (ft) 1: ambulated 12 feetx1 and 15 feetx1 with RW at min A x1 wit hcueing for stepping pattern, walker progression, no buckle or LOB but unsteadiness noted.  Extremity/Trunk Assessments:  RUE   RUE : Within Functional Limits  LUE   LUE: Within Functional Limits  RLE   RLE : Within Functional Limits  LLE   LLE : Within Functional Limits  Treatments:  Therapeutic Exercise  Therapeutic Exercise Performed: Yes (LLE only)  Therapeutic Exercise Activity 1: ankle pumps  Therapeutic Exercise Activity 2: glute sets  Therapeutic Exercise Activity 3: quad sets  Therapeutic Exercise Activity 4: hip abduction  Therapeutic Exercise Activity 5: SLR  Therapeutic Exercise Activity 6: SAQ  Therapeutic Exercise Activity 7: heel slides    Therapeutic Activity  Therapeutic Activity Performed: Yes  Therapeutic Activity 1: pt toileted without assistance and completed hygiene without assistance from PT. PT provided hand wipe for cleansing and patient able to complete on own without help.  Outcome Measures:  [facility] Basic Mobility  Turning from your back to your side while in a flat bed without using bedrails: None  Moving from lying on your back to sitting on the side of a flat bed without using bedrails: None  Moving to and from bed to chair (including a wheelchair): A little  Standing up from a chair using your arms (e.g. wheelchair or bedside chair): A little  To walk in hospital room: A little  Climbing 3-5 steps with railing: A little  Basic Mobility - Total Score: 20    Encounter Problems       Encounter Problems (Active)       Balance       LTG - Patient will maintain standing and sitting balance to allow for completion of daily activities (Progressing)       Start:  07/08/24               Compromised Skin Integrity       LTG - Patient will be  free from infection (Progressing)       Start:  07/08/24               Mobility       LTG - Patient will ambulate 200 feetx1 at mod I with RW (Progressing)       Start:  07/08/24            LTG - Patient will navigate 4 steps with railing at Dsx1  (Progressing)       Start:  07/08/24               PT Transfers       LTG - Patient will transfer from one surface to another at mod I with RW (Progressing)       Start:  07/08/24               Pain          Safety       LTG - Patient will demonstrate safety requirements appropriate to situation/environment (Progressing)       Start:  07/08/24                   Education Documentation  Handouts, taught by Claire Altamirano, PT at 7/8/2024  4:25 PM.  Learner: Patient  Readiness: Acceptance  Method: Explanation, Demonstration, Handout  Response: Verbalizes Understanding, Demonstrated Understanding    Precautions, taught by Claire Altamirano PT at 7/8/2024  4:25 PM.  Learner: Patient  Readiness: Acceptance  Method: Explanation, Demonstration, Handout  Response: Verbalizes Understanding, Demonstrated Understanding    Body Mechanics, taught by Claire Altamirano PT at 7/8/2024  4:25 PM.  Learner: Patient  Readiness: Acceptance  Method: Explanation, Demonstration, Handout  Response: Verbalizes Understanding, Demonstrated Understanding    Home Exercise Program, taught by Claire Altamirano PT at 7/8/2024  4:25 PM.  Learner: Patient  Readiness: Acceptance  Method: Explanation, Demonstration, Handout  Response: Verbalizes Understanding, Demonstrated Understanding    Mobility Training, taught by Claire Altamirano PT at 7/8/2024  4:25 PM.  Learner: Patient  Readiness: Acceptance  Method: Explanation, Demonstration, Handout  Response: Verbalizes Understanding, Demonstrated Understanding    Education Comments  No comments found.    Claire Altamirano, PT, DPT

## 2024-07-08 NOTE — ANESTHESIA PROCEDURE NOTES
Peripheral Block    Patient location during procedure: pre-op  Start time: 7/8/2024 9:14 AM  End time: 7/8/2024 9:16 AM  Reason for block: at surgeon's request and post-op pain management  Staffing  Performed: attending   Authorized by: Cynthia Foss MD MPH    Performed by: Cynthia Foss MD MPH  Preanesthetic Checklist  Completed: patient identified, IV checked, site marked, risks and benefits discussed, surgical consent, monitors and equipment checked, pre-op evaluation and timeout performed   Timeout performed at: 7/8/2024 9:08 AM  Peripheral Block  Patient position: sitting  Prep: ChloraPrep  Patient monitoring: heart rate  Block type: other (I PACK)  Laterality: left  Injection technique: single-shot  Guidance: ultrasound guided  Local infiltration: ropivacaine  Infiltration strength: 0.5 %  Dose: 12 mL  Needle  Needle gauge: 21 G  Needle length: 10 cm  Needle localization: ultrasound guidance     image stored in chart  Assessment  Injection assessment: negative aspiration for heme, no paresthesia on injection, incremental injection and local visualized surrounding nerve on ultrasound  Slow fractionated injection: yes

## 2024-07-08 NOTE — HH CARE COORDINATION
Home Care received a Referral for Physical Therapy. We have processed the referral for a Start of Care on 24 HOURS .     If you have any questions or concerns, please feel free to contact us at [phone]. Follow the prompts, enter your five digit zip code, and you will be directed to your care team on [facility].

## 2024-07-08 NOTE — PROGRESS NOTES
SDOH-   07/08/24 1711   Stress   Do you feel stress - tense, restless, nervous, or anxious, or unable to sleep at night because your mind is troubled all the time - these days? To some exte  (Takes Prozac daily for depression)   Social Connections   In a typical week, how many times do you talk on the phone with family, friends, or neighbors? More than 3   How often do you get together with friends or relatives? More than 3   How often do you attend Shinto or Confucianism services? 1 to 4   Do you belong to any clubs or organizations such as Shinto groups, unions, fraternal or athletic groups, or school groups? Yes  (Catholic)   How often do you attend meetings of the clubs or organizations you belong to? 1 to 4   Are you , , , , never , or living with a partner?    Intimate Partner Violence   Within the last year, have you been afraid of your partner or ex-partner? No   Within the last year, have you been humiliated or emotionally abused in other ways by your partner or ex-partner? No   Within the last year, have you been kicked, hit, slapped, or otherwise physically hurt by your partner or ex-partner? No   Within the last year, have you been raped or forced to have any kind of sexual activity by your partner or ex-partner? No   Alcohol Use   Q1: How often do you have a drink containing alcohol? Never   Q2: How many drinks containing alcohol do you have on a typical day when you are drinking? None   Q3: How often do you have six or more drinks on one occasion? Never   Utilities   In the past 12 months has the electric, gas, oil, or water company threatened to shut off services in your home? No   Health Literacy   How often do you need to have someone help you when you read instructions, pamphlets, or other written material from your doctor or pharmacy? Never

## 2024-07-08 NOTE — DISCHARGE INSTRUCTIONS
****TAKE BLOOD PRESSURE BEFORE RESTARTING YOUR HYPERTENSION MEDICATIONS. RESTART MEDICATION IF BP IS GREATER OR EQUAL /90. IT MAY TAKE 1-2 DAYS AFTER SURGERY TO START INCREASING.****    [facility]                            Sravani Tavarez MBA, BSN, RN-BC Orthopedic Patient Navigator Phone: [phone]    Mynor Angelo D.O.  Gm Marrero PA-C  Phone: [phone]    POSTOPERATIVE INSTRUCTIONS: TOTAL HIP & TOTAL KNEE ARTHROPLASTY    General/highlights: Flex/tighten the muscles in the buttocks, thighs and calves often when in chair or bed.  Utilize the incentive spirometer often especially the next 3 days.  Walk at least 10 steps every hour that you are awake.  Take stairs 1 at a time, good leg leads up, bed leg legs down, use the handrails.  You may be weightbearing as tolerated, in general the walker is used for 2 weeks.    PAIN, SWELLING & BRUISING  Some pain, stiffness and swelling is normal for up to 1 year after surgery.  Pain will start to let up over time depending on your activity level.  It is preferable to rest for 24 hours following your surgery  Pain is often delayed for 24-48 hours after surgery.  Pain may be dull/achy, throbbing, or even sharp/nerve sensations  It is normal for swelling and bruising to worsen before it gets better.  These symptoms usually peak 1 week after surgery  Swelling and bruising may appear throughout the leg, all the way down to your toes  Wear your compression stockings every day during the day for 14 days and remove them at night.  This will help control swelling    WOUND CARE INSTRUCTIONS  Your surgical bandage will be removed 2 weeks after surgery at your post-operative visit.  If your bandage becomes compromised, begins to come off before then, or soaks through with drainage call the office immediately.  You may have sutures under the skin that dissolve on their own over time.    As the sutures absorb occasionally a small suture abscess  can develop, this is not uncommon for up to 6 weeks, if this occurs please notify your surgeon immediately.    HYGIENE  You may shower 24 hours after your surgery, provided the Mepilex silver dressing is in place.  No tub bathing or submerging underwater.  Do not scrub directly over the surgical bandage  Do not use any creams, lotions or ointments on the surgical leg for 4 weeks after surgery, or until you have been cleared to do so by your surgeon    GENERAL INSTRUCTIONS  Do not drink alcoholic beverages (beer and wine included) for 24 hours following your surgery, or while you are taking narcotic pain medications  Delay making important decisions until you are fully recovered  You cannot swim or submerge in water for at least 6 weeks after surgery, or until you are cleared by your surgeon.  You may start kneeling 3 months after knee replacement surgery, once you have been cleared by your surgeon.  This may not ever feel “normal” or comfortable    HOME DIET  Resume your normal diet after surgery. If you are on a specific type of diet for your condition, resume that instead.    Choose foods that help promote good bowel habits and prevent constipation, such as foods high in fiber.    POSTOPERATIVE MEDICATIONS  Pain medications have been ordered to help manage pain throughout recovery.  While you are using narcotic pain medication, you should be using a stool softener or laxative to prevent constipation.  My preference is parallax powder once or twice a day when taking the narcotic pain medicine  It is important to eat a small meal or snack before taking pain medications to avoid nausea or stomach upset.    MEDICATION REFILLS - [phone]  If you need to request a medication refill, please call the office between 8:30am-4:30pm, Monday through Friday.    Any calls received outside of this timeframe will be handled on the next business day.    Medication requests received on Saturday or Sunday will be handled on  "Monday.    Please allow 3-5 business days for all medication requests to be processed.    RESTARTING HOME MEDICATIONS  You may restart your home medications the following day after your surgery UNLESS you have been given alternate instructions.    Follow the instructions given to you on your hospital discharge instructions for more information regarding your home medications.    ASSISTIVE DEVICE & MOVEMENT  Initially, you will use a walker or crutches to walk for the first 2 weeks.  Once your therapist feels you are ready, you will wean to one crutch or cane followed by no assistive device.  It is important to keep moving throughout recovery.  Walk at least 10 steps every hour that you are awake.  Stairs are part of your recovery, the \"good \"leg leads on the way up, the \"bad \"leg leads on the way down to, use the handrails and not the walker or crutches.  You should be up and walking around several times per day as well as bending your knee and making sure your knee is going completely straight (for knee replacements).  For anterior hip replacements avoid straight leg raise.    NUMBNESS/CLICKING  Decreased sensation or numbness is common on or around the area of the incision due to sensory nerves that are affected at the time of surgery.  The area of numbness will be lateral to the incision  You might always have numbness but the size of the area of numbness should decrease with time.  It is common for patients to have a “click” in the knee with movement.  This is usually nothing to be concerned about.  There are several reasons why your knee can be making these noises, including the implant components rubbing against each other, or a tendons going over a bony prominence.  Grinding can also occur and is not out of the ordinary.  Grinding can be caused by scar tissue formation  Noises will often settle over time once muscle strength improves.    DIFFICULTY SLEEPING  It is very common for patients to have difficulty " sleeping at night which can be caused pain, medication, or feeling of anxiety.  Sleep disturbance typically worsens 4-6 weeks after surgery.  You are permitted to sleep on your back or side with a  pillow between your legs for comfort    DRIVING & TRAVEL  Your surgeon will address this at your post-op appointment.  You must not be taking narcotic pain medication to be cleared to drive.  LEFT LEG JOINT REPLACMENT:  You may drive once you have regained full control of your leg, typically around 2 week after surgery  RIGHT LEG JOINT REPLACEMENT:  You must speak with your surgeon before you resume driving.  Driving can typically be resumed by 4-6 weeks after surgery.  During long distance travel, you should attempt to change position or stand every hour.  You should complete ankle pumps throughout your travel if you are sitting for long periods of time.  If traveling within the first 2 weeks after surgery, you should wear your compression stockings.    DENTAL & OTHER PROCEDURES    All patients must wait a minimum of 3 months for elective procedures, including routine dental cleanings.  For any dental appointment - cleaning or dental procedures - patients must take a prophylactic antibiotic 1 hour before the appointment.  You will also need to call for an antibiotic prior to any other invasive test, procedure, or surgery.  These prophylactic antibiotics will be needed for the rest of your life, in order to prevent infections.  Please call your surgeons office at [phone] to request the antibiotic.      PHYSICAL THERAPY  Following surgery it is important to progress through recovery with in-home or outpatient physical therapy.  You should continue to complete home exercises provided from the hospital on days that you are not working with a physical therapist.  It is common to have a temporary increase in pain and swelling upon starting outpatient physical therapy and/or changing your exercise routine.  Continue to use  ice to help with symptoms.     FOLLOW-UP APPOINTMENT - [phone]    Your post-surgical appointments will take place approximately 2 weeks, 6 weeks, 3 months and 1 year following surgery.  Joint replacements are monitored thereafter every 2-5 years for life.  If you have any questions or need to make changes to this appointment, please contact the office:    EMERGENCIES & WHEN TO CALL YOUR SURGEON  When to contact our office immediately:  Any falls or injury to the joint replacement  Fever >101.5 for at least 48 hours after surgery or chills.  Excessive bleeding from incision(s). A small amount of drainage is normal and expected.  Signs of infection of incision(s)-excessive drainage that is soaking through your dressing (especially if it is pus-like), redness that is spreading out from the edges of your incision, or increased warmth around the area.  Excruciating pain for which the pain medication, taken as instructed, is not helping.  Severe calf pain.  Go directly to the emergency room or call 911, if you are experiencing chest pain or difficulty breathing.    ICE/COLD THERAPY  Ice is most important during the first 2 weeks after surgery, but should be used for several weeks as needed.  Never place ice, or cold therapy devices directly on the skin.  You should always have a protective layer between your skin and the cold.  You have been prescribed to ice your total joint at a minimum of twice per hour for 20 minutes while awake during the first 6 weeks after surgery if you are using ice packs. This will help with pain control.  If you are using an ice machine, please follow ice machine instructions.  After knee replacement is extremely important to elevate the leg straight on an incline, not flat.    COLD THERAPY MACHINE RECOMMENDATIONS  Cold therapy devices can be used before and after surgery to assist in comfort and help to reduce pain and swelling.  These devices differ from ice or ice packs, whereas the  mechanism circulates water through tubing and a pad to provide longer periods of cold therapy to the desired site.  While in the hospital, you can use your cold devices around the clock for optimal comfort.  We recommend using cold therapy after working with therapy or completing exercises on your own.  Once you are discharged home, there is no set schedule in which you must follow while using cold therapy.  Below are a few points to remember when using a cold therapy device:    Read the 's instructions prior to first the use.  Follow instructions for filling the cooler (water first, then ice).  Always make sure there is a layer of protection between the cold pad and your skin (Clothing, Towel, Ace Bandage, etc.)  Allow the device to circulate cold water throughout the pad prior wrapping the pad around your leg (approximately 10 minutes).  Place the pad on your leg in the desired position to meet your pain management needs and use the wraps provided to secure the pad to your body.  The purpose of this device is to use consistently throughout the day.  You do not need to need to use the 20 on, 20 off method when using an ice machine.  During waking hours, remove the cold pad every 1-2 hours to perform a skin check  Detach the pad from the cooler and ambulate at least once every hour  After removing the pad, allow at least 30 minutes before resuming cold therapy  You may wear the cold therapy device during periods of sleep including overnight    If you wake up during the night, you can check the skin at this time.  You do not need to wake up specifically to perform skin checks.  Empty the cooler and pad when device is not in use.  Follow 's instructions for cleaning your cold therapy device.    [facility] can assist with problems related to products purchased through the [facility] [place]  [phone] - [facility]  or   [phone] - [facility]      Medication Refills - [phone] - Monday  through Friday 8:30am-4:30pm  Please allow 3-5 days for medication refill requests to be processed.

## 2024-07-08 NOTE — NURSING NOTE
Patient came upstairs from having left knee surgery. Oriented patient to room, call light, and phone within reach.

## 2024-07-08 NOTE — ANESTHESIA PROCEDURE NOTES
Peripheral Block    Patient location during procedure: pre-op  Start time: 7/8/2024 9:12 AM  End time: 7/8/2024 9:14 AM  Reason for block: at surgeon's request and post-op pain management  Staffing  Performed: attending   Authorized by: Cynthia Foss MD MPH    Performed by: Cynthia Foss MD MPH  Preanesthetic Checklist  Completed: patient identified, IV checked, site marked, risks and benefits discussed, surgical consent, monitors and equipment checked, pre-op evaluation and timeout performed   Timeout performed at: 7/8/2024 9:08 AM  Peripheral Block  Patient position: sitting  Prep: ChloraPrep  Patient monitoring: heart rate  Block type: adductor canal  Laterality: left  Injection technique: single-shot  Guidance: ultrasound guided  Local infiltration: ropivacaine  Infiltration strength: 0.5 %  Dose: 18 mL  Needle  Needle gauge: 21 G  Needle length: 10 cm  Needle localization: ultrasound guidance     image stored in chart  Assessment  Injection assessment: negative aspiration for heme, no paresthesia on injection, incremental injection and local visualized surrounding nerve on ultrasound  Slow fractionated injection: yes

## 2024-07-08 NOTE — ANESTHESIA PROCEDURE NOTES
Spinal Block    Patient location during procedure: OR  Start time: 7/8/2024 9:28 AM  End time: 7/8/2024 9:38 AM  Reason for block: primary anesthetic  Staffing  Performed: attending   Authorized by: Cynthia Foss MD MPH    Performed by: Cynthia Foss MD MPH    Preanesthetic Checklist  Completed: patient identified, IV checked, risks and benefits discussed, surgical consent, pre-op evaluation, timeout performed and sterile techniques followed  Block Timeout  RN/Licensed healthcare professional reads aloud to the Anesthesia provider and entire team: Patient identity, procedure with side and site, patient position, and as applicable the availability of implants/special equipment/special requirements.    Timeout performed at: 7/8/2024 9:25 AM  Spinal Block  Patient position: sitting  Prep: Betadine  Sterility prep: cap, gloves, hand hygiene, mask and drape  Sedation level: light sedation  Patient monitoring: blood pressure, continuous pulse oximetry and heart rate  Approach: midline  Vertebral space: L3-4  Injection technique: single-shot  Needle  Needle type: pencil-point   Needle gauge: 25 G  Needle length: 3.5 in  Free flowing CSF: yes    Assessment  Block outcome: patient comfortable  Procedure assessment: patient tolerated procedure well with no immediate complications  Additional Notes  Sev attempts by AA and myself at L2-3 -> L3-4 x1 attempt

## 2024-07-08 NOTE — OP NOTE
OPEN TOTAL KNEE ARTHROPLASTY (L) Operative Note     Date: 2024  OR Location: [facility]    Name: Carmella Shin, : 1950, Age: 74 y.o., MRN: [id number], Sex: female    Diagnosis  Pre-op Diagnosis     * Primary osteoarthritis of left knee [M17.12] Post-op Diagnosis     * Primary osteoarthritis of left knee [M17.12]     Procedures  OPEN TOTAL KNEE ARTHROPLASTY  80959 - MA ARTHRP KNE CONDYLE&PLATU MEDIAL&LAT COMPARTMENTS      Surgeons      * Mynor Angelo - Primary    Resident/Fellow/Other Assistant:  Gm Marrero PA-C    Procedure Summary  Anesthesia: Regional, Spinal , MAC sedation ASA: III  Anesthesia Staff: Anesthesiologist: Cynthia Foss MD MPH  C-AA: LORENA Mcgrath  Estimated Blood Loss: 5 mL  Intra-op Medications:   Administrations occurring from 0930 to 1220 on 24:   Medication Name Total Dose   ropivacaine-epinephrine-clonidine-ketorolac 2.46-0.005- 0.0008-0.3mg/mL periarticular syringe 50 mL   lactated Ringer's infusion Cannot be calculated   ceFAZolin in dextrose (iso-os) (Ancef) IVPB 2 g 2 g              Anesthesia Record               Intraprocedure I/O Totals          Intake    Tranexamic Acid 0.00 mL    The total shown is the total volume documented since Anesthesia Start was filed.    Propofol Drip 0.00 mL    The total shown is the total volume documented since Anesthesia Start was filed.    lactated Ringer's infusion 1000.00 mL    ceFAZolin in dextrose (iso-os) (Ancef) IVPB 2 g 100.00 mL    Total Intake 1100 mL          Specimen: No specimens collected     Staff:   Circulator: Leslee Vieyraub Person: Delfina Vieyraub Person: Janet         Drains and/or Catheters: * None in log *    Tourniquet Times:     Total Tourniquet Time Documented:  Leg (Left) - 70 minutes  Total: Leg (Left) - 70 minutes      Implants:  Implants       Type Name Action Serial No.      Implant CEMENT, BONE, SIMPLEX HV FULL DOSE  40 GM - [id number] Implanted      Joint PIN, HEADLESS 1/8 X 3.5 FLUTE 4/PK STERILE -  [id number] Implanted      Joint FEM COMP, TRIATH RAFAELA PS P 4 LEFT - [id number] Implanted      Joint BASEPLATE, TIBIA 4 TS TRIATH - [id number] Implanted       11MM TS PLUS TIBIAL INSERT - X3POLY #4  Implanted      Joint PATELLA, TRIATHLON, ASYMMETRIC X3, SZ-A32 10MM - [id number] Implanted               Findings: See dictation below    Indications: Carmella Shin is an 74 y.o. female who is having surgery for Primary osteoarthritis of left knee [M17.12].  The left Alen assisted total knee arthroplasty    The patient was seen in the preoperative area. The risks, benefits, complications, treatment options, non-operative alternatives, expected recovery and outcomes were discussed with the patient. The possibilities of reaction to medication, pulmonary aspiration, injury to surrounding structures, bleeding, recurrent infection, the need for additional procedures, failure to diagnose a condition, and creating a complication requiring transfusion or operation were discussed with the patient. The patient concurred with the proposed plan, giving informed consent.  The site of surgery was properly noted/marked if necessary per policy. The patient has been actively warmed in preoperative area. Preoperative antibiotics have been ordered and given within 1 hours of incision. Venous thrombosis prophylaxis have been ordered including unilateral sequential compression device    Procedure Details: Date of surgery: 7/8/2024    Location: [facility]    Pre-Operative Diagnosis: Left knee end-stage bone-on-bone tricompartmental osteoarthritis with valgus deformity    Post-Operative Diagnosis: Left knee end-stage bone-on-bone tricompartmental osteoarthritis with valgus deformity    Procedure: Left Alen assisted total knee arthroplasty    Implants:  1.  [place] triathlon posterior stabilized femur-size 4  2.  [place] triathlon universal tibial baseplate-size 4  3.  [place] X.3, TS polyethylene-size 4 x 11 mm thickness  4.   Josh X.3, asymmetric all polyethylene patella-size A32  Components were cemented    Surgeon: Mynor Angelo DO    First Assistant: Gm Marrero PA-C    Intraoperative pathology and findings/operative indications:  Patient is a pleasant 74-year-old female longstanding history of worsening left knee pain and deformity.  Patient admits to sense of instability about her knee worsening deformity pain and discomfort making activities of daily living difficult.  She has failed numerous attempts at conservative management over an extended period of time.  Physical exam revealed a awkward gait obvious valgus deformity.  Range of motion in the office revealed recurvatum approximately 5 degrees flexion 130 degrees profound valgus deformity approximately 30 degrees which was correctable towards neutral attritional laxity the medial collateral ligament obvious.  Crepitance and palpable effusion noted on exam as well.  Preoperative radiographs revealed severe bone-on-bone tricompartmental osteoarthritis bone-on-bone apposition in the lateral compartment on the AP view, Baldwin view demonstrating bone-on-bone medial and lateral compartment apposition lateral demonstrating profound bone-on-bone patellofemoral arthritic disease.  Hypertrophic osteophytic formation noted tricompartmentally with associated subchondral cystic changes and subchondral sclerosis.  Continued pain and disability that greatly interfered with her quality of life having failed numerous attempts at conservative management ultimately the patient did choose to move forward with total knee arthroplasty.  At the time of the procedure exam under anesthesia revealed range of motion recurvatum approximately 10 degrees flexion to approximately 135 degrees.  Valgus deformity approximately 30 degrees which was correctable and over correctable past neutral obvious medial collateral and fibular collateral ligament laxity noted.  Intraoperatively the patient was  found to have a arthritic related joint effusion hypertrophic arthritic related synovitis.  Mucoid degeneration of the cruciate ligaments with attritional tearing of the ACL was noted.  Macerated medial lateral meniscus tissue were noted.  Complete articular cartilage loss noted tricompartmentally osteophytic formation noted tricompartmentally.  Bone quality minimally osteopenic/osteoporotic.  There was no indication of infection at the time of the procedure.    Procedure in detail:  Patient was correctly identified and marked in preoperative holding risks benefits alternatives and reasonable expectations for outcomes have previously been discussed.  Also in preoperative holding the patient received a regional block by the Department of Anesthesia.  The patient was escorted to operative suite #6 at [facility], once in the operative suite surgical pause/time-out was performed preoperative antibiotics were administered and spinal anesthetic was provided by the department of anesthesia.  Patient is positioned supine on a standard operative table bony prominences well padded nonsterile tourniquet applied to left proximal thigh.  The left lower extremity was then sterilely prepped and draped in standard fashion.  Anatomic landmarks were identified and marked with sterile marking pen.  Esmarch bandage was applied knee was flexed and tourniquet was raised to 250 millimeters of mercury.  A standard anterior incision was then created with a 10 blade beginning 3 fingerbreadths proximal to the superior pole of the patella carried distally towards the medial aspect of the tibial tubercle.  Careful dissection through the subcutaneous tissues deep to level of Stulberg's fascia was performed creating a medial flap only.  Once the extensor mechanism was adequately exposed a fresh 10 blade was used to make a standard medial parapatellar arthrotomy.  The deep MCL and medial capsule were then elevated off the medial  tibial plateau with Bovie cautery and MCL retractor was inserted.  The knee was taken to full extension suprapatellar synovitis was then sharply excised with Bovie cautery.  The patella was everted and the knee was hyperflexed.  Hoffa's fat pad was sharply excised with a 10 blade.  The anterior cruciate ligament and posterior cruciate ligament were also sacrificed with a 10 blade.  Appropriate collateral retractors were then positioned.  At this time tibial and femoral checkpoints were inserted along with Alen array pins.  Arrays were appropriately oriented.  Hip center was checked.  Registration was then performed on both the femur and the tibia in standard fashion.  Medial tibial plateau and medial femoral condyle osteophytes were removed with a curved osteotome and rongeur.  Soft tissue balancing was then performed in extension and in flexion utilizing spoons.  Intraoperative Alen templating and component positioning was then performed excellent balance was achieved in the simulation.  At this time the Alen arm assist was brought into the operative field and sequential cuts were completed in standard fashion excess bone then removed with curved osteotome and rongeur.  Medial and lateral meniscus were sharply excised with Bovie cautery.  At this point the knee was taken to 90 degrees of flexion a lamina  was placed between the tibia and the femur and posterior femoral osteophytes were removed with a curved osteotome.  Femoral box cut guide was attached box cut was then performed wafer of bone removed.  The tibia was once again subluxed anteriorly and sized a size 4 tibial trial was found to be most appropriate this was then provisionally pinned into place with rotation oriented towards the medial 1/3 of the tibial tubercle.  Trial polyethylene was inserted followed by trial femoral component.  The knee was then taken through range of motion varus valgus stability was assessed.  At this point the knee was  found to achieve 0 degrees of extension to 142 degrees of flexion and the varus valgus stable at 0 degrees, 30 degrees, 60 degrees no pistoning noted at 90 degrees and the patella was found to track midline.  The knee was taken to full extension patellae everted native patella thickness was measured at 22 millimeters.  A patellar cutting clamp was then used to resect 8 millimeters of patellar bone 3 lug holes were created through the appropriate guide and a trial polyethylene button was attached.  The knee was again taken through range of motion the patella was again found to track midline.  At this point, sharifa arrays were removed and the trial polyethylene was removed 2 lug holes were created through the distal aspect of the trial femoral component which was then removed and the tibia was again subluxed anteriorly.  The smokestack guide was attached onto the trial tibial tray and a keel punch was passed trial tibial tray was then removed and dilating keel punch was passed.  At this point the knee was taken to full extension and copiously irrigated with sterile saline via Simpulse lavage.  A pericapsular pain injection was then provided.  At this point implants were opened, cemented was mixed.  Implants were then cemented and sequentially impacted into place and found of excellent fixation.  Excess cement removed with freer elevators.  The knee was once again taken into full extension as the cemented was allowed to cure.  The Knee was then copiously irrigated with dilute sterile betadine followed by repeat irrigation with sterile saline via Simpulse lavage.  The knee was taken through range of motion stability range of motion were found to be equal to that of the trial components.  At this point the knee was hyperflexed and the tourniquet was dropped at 69 minutes.  Hemostasis was achieved with Bovie cautery.  The knee was then taken into full extension in the medial parapatellar arthrotomy was closed with a 0  Vicryl ligature in interrupted figure-of-eight and running locked fashion.  Deep subcutaneous tissues were closed with 2-O Vicryl ligature in buried interrupted fashion skin was reapproximated with 4 Monocryl in running subcuticular fashion followed by application of Dermabond.  A self adherent Mepilex Silver dressing was applied applied over top of the wound after the Dermabond was completely dry.  A Thigh high compressive stocking was then applied along with a iceman cooler.  Patient was then woken transferred to a hospital bed and returned to PACU in stable condition.  Counts were correct case was clean elective complications were none, tourniquet time was 69 minutes, blood loss was 5 cc post tourniquet drop.          Complications:  None; patient tolerated the procedure well.    Disposition: PACU - hemodynamically stable.  Condition: stable         Additional Details:     Attending Attestation: I performed the procedure.    Mynor Angelo  Phone Number: [phone]

## 2024-07-08 NOTE — ANESTHESIA POSTPROCEDURE EVALUATION
Patient: Carmella Shin    Procedure Summary       Date: 07/08/24 Room / Location: [facility] 06 / [facility]    Anesthesia Start: 0922 Anesthesia Stop: 1215    Procedure: OPEN TOTAL KNEE ARTHROPLASTY (Left: Knee) Diagnosis:       Primary osteoarthritis of left knee      (Primary osteoarthritis of left knee [M17.12])    Surgeons: Mynor Angelo DO Responsible Provider: Cynthia Foss MD MPH    Anesthesia Type: regional, spinal ASA Status: 3            Anesthesia Type: regional, spinal    Vitals Value Taken Time   /66 07/08/24 1224   Temp 36.2 °C (97.2 °F) 07/08/24 1209   Pulse 100 07/08/24 1224   Resp 16 07/08/24 1224   SpO2 92 % 07/08/24 1224       Anesthesia Post Evaluation    Patient location during evaluation: PACU  Patient participation: complete - patient participated  Level of consciousness: awake  Pain score: 0  Pain management: adequate  Multimodal analgesia pain management approach  Airway patency: patent  Two or more strategies used to mitigate risk of obstructive sleep apnea  Cardiovascular status: acceptable  Respiratory status: acceptable  Hydration status: acceptable  Postoperative Nausea and Vomiting: none        There were no known notable events for this encounter.

## 2024-07-08 NOTE — CONSULTS
Inpatient consult to Medicine  Consult performed by: Jacqueline Baca PA-C  Consult ordered by: Gm Marrero PA-C  Reason for consult: UC, rectal cancer,pancreatic cyst, HTN, GERD, anxiety          History and Physical    Carmella Shin is a 74 y.o. female on day 0 of admission presenting with Osteoarthritis of left knee.  Room: 222    Subjective   74 year old female with pmhx UC, rectal cancer,pancreatic cyst, HTN, GERD, anxiety is s/p L TKR.  Hemodynamically stable     PMHx:  Past Medical History:   Diagnosis Date    Anxiety     Benign neoplasm of meninges, unspecified (Multi)     Incidental note is made of a right middle cranial fossa meningioma measuring approximately 5 mm, per sinus CT     Chronic rhinitis     GERD (gastroesophageal reflux disease)     HLD (hyperlipidemia)     Hypertension     Osteopenia     Pancreatic cyst ([facility])     Imaging features favor that of a serous cystic neoplasm, EUS was non diagnostic    Rectal cancer (Multi)     Pathology: Tubular adenoma with intramucosal adenocarcinoma    Ulcerative colitis (Multi)     S/P colectomy in the       Past Surgical Hx:  Past Surgical History:  2016: CARDIAC CATHETERIZATION      Comment:  Impression: No significant angiographic coronary artery                disease.  No date: CATARACT EXTRACTION  No date:  SECTION, CLASSIC      Comment:  x2  : COLECTOMY PARTIAL / TOTAL      Comment:  w/ ileal rectal anastomosis  2009: COLON SURGERY      Comment:  Abdominoperineal resection of rectal tumor with                ileostomy, and BSO  2019: ERCP      Comment:  For pancreatic cyst, nondiagnostic  No date: FLEXIBLE SIGMOIDOSCOPY      Comment:  Multiple for UC  2015: FOOT SURGERY; Right      Comment:  Arthrodesis 1st, 2nd, and 3rd tarsometatarsal joint,                right foot.  2009: ILEOSTOMY  No date: INDUCED   No date: TONSILLECTOMY  2024: TOTAL KNEE ARTHROPLASTY; Left    Social  "history:   reports that she quit smoking about 50 years ago. Her smoking use included cigarettes. She started smoking about 55 years ago. She has a 6.3 pack-year smoking history. She has been exposed to tobacco smoke. She has never used smokeless tobacco. She reports that she does not drink alcohol and does not use drugs.    Family history: cancer, heart disease    Allergies   Allergen Reactions    Vancomycin Hives    Ace Inhibitors Cough    Bupropion Nausea Only and Tinnitus    Escitalopram Oxalate Swelling    Metronidazole Itching    Penicillins Itching     Pt states last dose about 10 years ago itching    Sulfa (Sulfonamide Antibiotics) Nausea Only    Venlafaxine Diarrhea     Home Medication:   ALPRAZolam (Xanax) 0.5 mg tablet Take 1 tablet (0.5 mg) by mouth. Take one tablet prior to falling.   ascorbic acid (Vitamin C) 100 mg tablet Take 1 tablet (100 mg) by mouth once daily. As directed   calcium phosphate-vitamin D3 250 mg-12.5 mcg (500 unit) tablet,chewable TAKES TOTAL 1250   FLUoxetine (PROzac) 10 mg capsule Take 1 capsule (10 mg) by mouth once daily.   losartan (Cozaar) 50 mg tablet Take 1 tablet (50 mg) by mouth once daily.   multivit-iron-minerals-folic acid (Centrum Silver, geriatric,) 0.4 mg-300 mcg- 250 mcg tab Take 1 tablet by mouth once daily.   pantoprazole (ProtoNix) 20 mg EC tablet TAKE 1 TABLET BY MOUTH EVERY DAY   rosuvastatin (Crestor) 10 mg tablet Take 1 tablet (10 mg) by mouth once daily.   azelastine (Astelin) 137 mcg (0.1 %) nasal spray Administer 2 sprays into each nostril once daily as needed.     Last Recorded Vitals  Blood pressure 105/52, pulse 90, temperature 36 °C (96.8 °F), temperature source Temporal, resp. rate 15, height 1.499 m (4' 11.02\"), weight 65.2 kg (143 lb 11.8 oz), SpO2 93%.    Physical Exam  General: Patient in no acute distress, lying in bed  HEENT: EOMI, moist mucous membranes, anicteric  Neck: No JVD, no cervical lymphadenopathy  Heart: RRR, no murmurs, rubs, or " gallops  Lungs: Clear to auscultation bilaterally, no wheezing, rhonchi, or rales  Abdomen: Soft, nontender, ileostomy with liquid stool  Extremities: No peripheral edema, +2 radial and pedal pulses  Skin: No rash or cyanosis  Neurological: AOx3, non focal  Psychiatric: Cooperative and pleasant    Recent Results:   Latest Reference Range & Units 06/20/24 11:19   GLUCOSE 74 - 99 mg/dL 90   SODIUM 136 - 145 mmol/L 140   POTASSIUM 3.5 - 5.3 mmol/L 3.8   CHLORIDE 98 - 107 mmol/L 102   Bicarbonate 21 - 32 mmol/L 28   Anion Gap 10 - 20 mmol/L 14   Blood Urea Nitrogen 6 - 23 mg/dL 10   Creatinine 0.50 - 1.05 mg/dL 0.55   EGFR >60 mL/min/1.73m*2 >90   Calcium 8.6 - 10.3 mg/dL 9.6   Hemoglobin A1C See below % 5.6   Estimated Average Glucose Not Established mg/dL 114   LEUKOCYTES (10*3/UL) IN BLOOD BY AUTOMATED COUNT, [place] 4.4 - 11.3 x10*3/uL 8.3   nRBC  COMMENT ONLY   ERYTHROCYTES (10*6/UL) IN BLOOD BY AUTOMATED COUNT, [place] 4.00 - 5.20 x10*6/uL 4.37   HEMOGLOBIN 12.0 - 16.0 g/dL 12.6   HEMATOCRIT 36.0 - 46.0 % 39.2   MCV 80 - 100 fL 90   MCH 26.0 - 34.0 pg 28.8   MCHC 32.0 - 36.0 g/dL 32.1   RED CELL DISTRIBUTION WIDTH 11.5 - 14.5 % 12.9   PLATELETS (10*3/UL) IN BLOOD AUTOMATED COUNT, [place] 150 - 450 x10*3/uL 315     Assessment/Plan   1) s/p L TKR   Ortho management   PT eval/treat  Incentive spirometry  BM management  Supportive Care  Antibiotic prophylaxis per ortho  DVT prophylaxis per ortho  2) DVT prophylaxis   325 mg aspirin twice daily, SCDs, ambulate  3) hypertension   Restart losartan with BP parameters  4) anxiety   Continue fluoxetine  5) GERD:   Continue PPI  6) s/p proctocolectomy w/ ileostomy   Discontinued stool softener         I spent 45 minutes in the professional and overall care of this patient.      Jacqueline Baca PA-C

## 2024-07-09 VITALS
TEMPERATURE: 96.8 F | WEIGHT: 143.74 LBS | RESPIRATION RATE: 16 BRPM | DIASTOLIC BLOOD PRESSURE: 50 MMHG | OXYGEN SATURATION: 99 % | HEART RATE: 57 BPM | HEIGHT: 59 IN | SYSTOLIC BLOOD PRESSURE: 123 MMHG | BODY MASS INDEX: 28.98 KG/M2

## 2024-07-09 LAB
ANION GAP SERPL CALC-SCNC: 13 MMOL/L (ref 10–20)
BUN SERPL-MCNC: 11 MG/DL (ref 6–23)
CALCIUM SERPL-MCNC: 8.9 MG/DL (ref 8.6–10.3)
CHLORIDE SERPL-SCNC: 103 MMOL/L (ref 98–107)
CO2 SERPL-SCNC: 26 MMOL/L (ref 21–32)
CREAT SERPL-MCNC: 0.62 MG/DL (ref 0.5–1.05)
EGFRCR SERPLBLD CKD-EPI 2021: >90 ML/MIN/1.73M*2
ERYTHROCYTE [DISTWIDTH] IN BLOOD BY AUTOMATED COUNT: 12.5 % (ref 11.5–14.5)
GLUCOSE SERPL-MCNC: 126 MG/DL (ref 74–99)
HCT VFR BLD AUTO: 32.5 % (ref 36–46)
HGB BLD-MCNC: 10.2 G/DL (ref 12–16)
MCH RBC QN AUTO: 28.3 PG (ref 26–34)
MCHC RBC AUTO-ENTMCNC: 31.4 G/DL (ref 32–36)
MCV RBC AUTO: 90 FL (ref 80–100)
NRBC BLD-RTO: ABNORMAL /100{WBCS}
PLATELET # BLD AUTO: 337 X10*3/UL (ref 150–450)
POTASSIUM SERPL-SCNC: 4.5 MMOL/L (ref 3.5–5.3)
RBC # BLD AUTO: 3.61 X10*6/UL (ref 4–5.2)
SODIUM SERPL-SCNC: 137 MMOL/L (ref 136–145)
WBC # BLD AUTO: 11.7 X10*3/UL (ref 4.4–11.3)

## 2024-07-09 PROCEDURE — 2500000004 HC RX 250 GENERAL PHARMACY W/ HCPCS (ALT 636 FOR OP/ED): Mod: JZ | Performed by: ORTHOPAEDIC SURGERY

## 2024-07-09 PROCEDURE — 2500000002 HC RX 250 W HCPCS SELF ADMINISTERED DRUGS (ALT 637 FOR MEDICARE OP, ALT 636 FOR OP/ED)

## 2024-07-09 PROCEDURE — 7100000011 HC EXTENDED STAY RECOVERY HOURLY - NURSING UNIT

## 2024-07-09 PROCEDURE — 2500000004 HC RX 250 GENERAL PHARMACY W/ HCPCS (ALT 636 FOR OP/ED)

## 2024-07-09 PROCEDURE — 97116 GAIT TRAINING THERAPY: CPT | Mod: GP

## 2024-07-09 PROCEDURE — 85027 COMPLETE CBC AUTOMATED: CPT

## 2024-07-09 PROCEDURE — 80048 BASIC METABOLIC PNL TOTAL CA: CPT

## 2024-07-09 PROCEDURE — 97530 THERAPEUTIC ACTIVITIES: CPT | Mod: GP

## 2024-07-09 PROCEDURE — 36415 COLL VENOUS BLD VENIPUNCTURE: CPT

## 2024-07-09 PROCEDURE — 2500000001 HC RX 250 WO HCPCS SELF ADMINISTERED DRUGS (ALT 637 FOR MEDICARE OP)

## 2024-07-09 ASSESSMENT — COGNITIVE AND FUNCTIONAL STATUS - GENERAL
TURNING FROM BACK TO SIDE WHILE IN FLAT BAD: A LITTLE
DRESSING REGULAR LOWER BODY CLOTHING: A LITTLE
MOBILITY SCORE: 19
MOVING TO AND FROM BED TO CHAIR: A LITTLE
MOVING TO AND FROM BED TO CHAIR: A LITTLE
TOILETING: A LITTLE
WALKING IN HOSPITAL ROOM: A LITTLE
HELP NEEDED FOR BATHING: A LITTLE
DRESSING REGULAR LOWER BODY CLOTHING: A LITTLE
MOVING TO AND FROM BED TO CHAIR: A LITTLE
HELP NEEDED FOR BATHING: A LITTLE
CLIMB 3 TO 5 STEPS WITH RAILING: A LITTLE
TURNING FROM BACK TO SIDE WHILE IN FLAT BAD: A LITTLE
WALKING IN HOSPITAL ROOM: A LITTLE
STANDING UP FROM CHAIR USING ARMS: A LITTLE
DAILY ACTIVITIY SCORE: 21
CLIMB 3 TO 5 STEPS WITH RAILING: A LITTLE
WALKING IN HOSPITAL ROOM: A LITTLE
CLIMB 3 TO 5 STEPS WITH RAILING: A LITTLE
DAILY ACTIVITIY SCORE: 21
TOILETING: A LITTLE
MOBILITY SCORE: 20
STANDING UP FROM CHAIR USING ARMS: A LITTLE

## 2024-07-09 ASSESSMENT — PAIN SCALES - WONG BAKER
WONGBAKER_NUMERICALRESPONSE: NO HURT
WONGBAKER_NUMERICALRESPONSE: HURTS LITTLE MORE

## 2024-07-09 ASSESSMENT — PAIN SCALES - GENERAL: PAINLEVEL_OUTOF10: 0 - NO PAIN

## 2024-07-09 ASSESSMENT — PAIN - FUNCTIONAL ASSESSMENT: PAIN_FUNCTIONAL_ASSESSMENT: WONG-BAKER FACES

## 2024-07-09 NOTE — PROGRESS NOTES
"Carmella Shin is a 74 y.o. female on day 0 of admission presenting with Osteoarthritis of left knee.    Subjective   Patient seen and examined postoperative day 1 status post left Alen assisted total knee arthroplasty.  Patient denies CP dose nausea vomiting or constitutional symptoms.  Pain reasonably well-controlled.       Objective     Physical Exam  Bandage left lower extremity clean dry intact no drainage 5 x 5 dorsi and plantarflexion strength 4+/5 quadriceps recruitment strength left lower extremity  Last Recorded Vitals  Blood pressure 116/53, pulse 56, temperature 35 °C (95 °F), temperature source Temporal, resp. rate 16, height 1.499 m (4' 11.02\"), weight 65.2 kg (143 lb 11.8 oz), SpO2 100%.  Intake/Output last 3 Shifts:  I/O last 3 completed shifts:  In: 2420 (37.1 mL/kg) [P.O.:240; I.V.:1980 (30.4 mL/kg); IV Piggyback:200]  Out: 550 (8.4 mL/kg) [Urine:500 (0.2 mL/kg/hr); Blood:50]  Weight: 65.2 kg     Relevant Results      Scheduled medications  acetaminophen, 975 mg, oral, q6h DERICK  aspirin, 325 mg, oral, BID  atorvastatin, 20 mg, oral, Nightly  FLUoxetine, 10 mg, oral, Daily  ketorolac, 15 mg, intravenous, q6h  losartan, 50 mg, oral, Daily  pantoprazole, 20 mg, oral, Daily  tranexamic acid, 10 mg/kg, intravenous, Once      Continuous medications  lactated Ringer's, 100 mL/hr, Last Rate: Stopped (07/08/24 2225)  lactated Ringer's, 100 mL/hr, Last Rate: 100 mL/hr (07/09/24 0613)      PRN medications  PRN medications: ALPRAZolam, benzocaine-menthol, carisoprodol, diphenhydrAMINE, gabapentin, lubricating eye drops, melatonin, morphine, naloxone, ondansetron ODT **OR** ondansetron, oxyCODONE, oxyCODONE, oxygen, simethicone, sodium chloride, traMADol  Results for orders placed or performed during the hospital encounter of 07/08/24 (from the past 24 hour(s))   CBC   Result Value Ref Range    WBC 11.7 (H) 4.4 - 11.3 x10*3/uL    nRBC      RBC 3.61 (L) 4.00 - 5.20 x10*6/uL    Hemoglobin 10.2 (L) 12.0 - 16.0 g/dL "    Hematocrit 32.5 (L) 36.0 - 46.0 %    MCV 90 80 - 100 fL    MCH 28.3 26.0 - 34.0 pg    MCHC 31.4 (L) 32.0 - 36.0 g/dL    RDW 12.5 11.5 - 14.5 %    Platelets 337 150 - 450 x10*3/uL   Basic metabolic panel   Result Value Ref Range    Glucose 126 (H) 74 - 99 mg/dL    Sodium 137 136 - 145 mmol/L    Potassium 4.5 3.5 - 5.3 mmol/L    Chloride 103 98 - 107 mmol/L    Bicarbonate 26 21 - 32 mmol/L    Anion Gap 13 10 - 20 mmol/L    Urea Nitrogen 11 6 - 23 mg/dL    Creatinine 0.62 0.50 - 1.05 mg/dL    eGFR >90 >60 mL/min/1.73m*2    Calcium 8.9 8.6 - 10.3 mg/dL                            Assessment/Plan   Principal Problem:    Osteoarthritis of left knee    Postoperative day #1 status post left Alen assisted total knee arthroplasty    Anticipate discharge to home with home health care and home physical therapy today  Continue in-house care as ordered  Follow-up as outpatient in 2 weeks as previously scheduled       I spent 10 minutes in the professional and overall care of this patient.      Mynor Angelo, DO

## 2024-07-09 NOTE — NURSING NOTE
Assumed care of patient at 1900. Patient is up to chair. Ambulates well even though she says she still has numbness in a good portion of her operative leg. Says she walks fine but it feels weird to walk. Her pain is low at this time, a 2 on scale of 0-10 but she would like to get pain medication before bed to help her sleep. She has no concerns at this time. Bed alarm on, call light and personal belongings within reach.

## 2024-07-09 NOTE — NURSING NOTE
Patient lying in bed with eyes closed, appears to be sleeping comfortably. Bed alarm on and call light within reach.

## 2024-07-09 NOTE — NURSING NOTE
Patient has no complaints. Pain is low, states it is mostly just a little achy. She is insistent on sleeping on her side. Reminded her it is imperative to keep her leg straight if she needs to do this. Pillows used for buffer to help keep straight. Call light within reach.

## 2024-07-09 NOTE — PROGRESS NOTES
Patient sleeping at the time of rounds, chart reviewed.  Vital signs are stable.  Nursing notes no issues.           Emelina Gaxiola MD

## 2024-07-09 NOTE — CARE PLAN
Problem: Pain  Goal: Takes deep breaths with improved pain control throughout the shift  Outcome: Progressing  Goal: Turns in bed with improved pain control throughout the shift  Outcome: Progressing  Goal: Walks with improved pain control throughout the shift  Outcome: Progressing  Goal: Performs ADL's with improved pain control throughout shift  Outcome: Progressing  Goal: Participates in PT with improved pain control throughout the shift  Outcome: Progressing  Goal: Free from opioid side effects throughout the shift  Outcome: Progressing  Goal: Free from acute confusion related to pain meds throughout the shift  Outcome: Progressing     Problem: Fall/Injury  Goal: Not fall by end of shift  Outcome: Progressing  Goal: Be free from injury by end of the shift  Outcome: Progressing  Goal: Verbalize understanding of personal risk factors for fall in the hospital  Outcome: Progressing  Goal: Verbalize understanding of risk factor reduction measures to prevent injury from fall in the home  Outcome: Progressing  Goal: Use assistive devices by end of the shift  Outcome: Progressing  Goal: Pace activities to prevent fatigue by end of the shift  Outcome: Progressing     Problem: Pain  Goal: LTG - Patient will manage pain with the appropriate technique/Intervention  Outcome: Progressing     Problem: Pain - Adult  Goal: Verbalizes/displays adequate comfort level or baseline comfort level  Outcome: Progressing     Problem: Safety - Adult  Goal: Free from fall injury  Outcome: Progressing     Problem: Discharge Planning  Goal: Discharge to home or other facility with appropriate resources  Outcome: Progressing     Problem: Chronic Conditions and Co-morbidities  Goal: Patient's chronic conditions and co-morbidity symptoms are monitored and maintained or improved  Outcome: Progressing     Problem: Resident has compromised skin integrity  Goal: I will maintain or improve skin integrity  Outcome: Progressing

## 2024-07-09 NOTE — PROGRESS NOTES
"  Progress Note:    Carmella Shin is a 74 y.o. female on day 0 of admission presenting with Osteoarthritis of left knee.    Subjective   During interdisciplinary rounds patient expresses acceptable pain level.  Concerned about her low blood pressure and when to restart her hypertension medications. I told her I would giver her instructions on her AVS. She will need to restart BP when BP is > or = 130/90.  I have reviewed all vitals, labs, and notes. Patient is medically acceptable for discharge.     Physical Exam  General: No acute distress, alert & oriented    Cardiac: RRR, NL S1 and S2, no murmurs, rubs or gallops    Pulmonary: Lungs clear to auscultation bilaterally, no wheezes, rhales or rhonchi    Abdomen: Soft, non-tender, non-distended    Extremities: No clubbing , cyanosis or edema.     Last Recorded Vitals  Blood pressure 116/53, pulse 56, temperature 35 °C (95 °F), temperature source Temporal, resp. rate 16, height 1.499 m (4' 11.02\"), weight 65.2 kg (143 lb 11.8 oz), SpO2 100%.    Relevant Results  Results from last 7 days   Lab Units 07/09/24  0227   WBC AUTO x10*3/uL 11.7*   HEMOGLOBIN g/dL 10.2*   HEMATOCRIT % 32.5*   PLATELETS AUTO x10*3/uL 337      Results from last 7 days   Lab Units 07/09/24  0227   SODIUM mmol/L 137   POTASSIUM mmol/L 4.5   CHLORIDE mmol/L 103   CO2 mmol/L 26   BUN mg/dL 11   CREATININE mg/dL 0.62   GLUCOSE mg/dL 126*   CALCIUM mg/dL 8.9      Estimated Creatinine Clearance: 61.3 mL/min (by C-G formula based on SCr of 0.62 mg/dL).    Assessment/Plan   1) s/p L TKR              Ortho has placed discharge order and summary  2) DVT prophylaxis              325 mg aspirin twice daily  3) hypertension              Restart losartan with BP parameters  4) anxiety              Continue fluoxetine  5) GERD:              Continue PPI  6) s/p proctocolectomy w/ ileostomy              Discontinued stool softener due to her baseline loose stools  Disposition-patient is medically acceptable for " discharge.         I spent 25 minutes in the professional and overall care of this patient.      Jacqueline Baca PA-C

## 2024-07-09 NOTE — PROGRESS NOTES
Medication Education     Medication education for Carmella Shin was provided to the patient and family for the following medication(s):    ASA  Carisoprodol  Gabapentin  Meloxicam  Oxycodone/APAP  Vitamin C      Medication education provided by a Pharmacist:  Dose, frequency, storage How to take and what to do if a dose is missed Proper dose, indication, possible ADRs How the medication works and benefits of taking it    Identified potential barriers to education:  None    Method(s) of Education:  Verbal Written materials provided and reviewed    An opportunity to ask questions and receive answers was provided.     Assessment of understanding the patient and family:  2= meets goals/outcomes    Additional Notes (if applicable): meds 2 beds delivered to patient    Kalie Enciso, ElverD

## 2024-07-09 NOTE — PROGRESS NOTES
Interdisciplinary Rounds were completed at the bedside with Patient.  Staff participating in rounds included: Clinical Nurse Orthopedic Coordinator Transitional Care Coordinator Hospitalist MD/PA.  Topics discussed included: Today's Plan of Care Discharge Plan and Accommodations Physical Therapy Medications/Preferred Pharmacy and the Patient was given the opportunity to ask additional questions or bring up any concerns at that time.  During the final discharge discussion and review of instructions, they will have another opportunity to review questions or concerns prior to leaving our care.  Patient was given information on who to call post-discharge should new questions or concerns arise.      The patient's plan includes:    Discharge Date/Disposition:  Home, Today with, Home Care Services  Discharge Needs: No Equipment Needs Identified  Medications/Pharmacy: [id number] service utilized for discharge prescriptions through [facility]

## 2024-07-09 NOTE — NURSING NOTE
Vitals:    07/09/24 0655   BP: 116/53   Pulse: 56   Resp: 16   Temp: 35 °C (95 °F)   SpO2: 100%      Patient lying in bed. Vital acquired. All needs met at this moment. Call light within reach. Bed at lowest position. Continue monitor.

## 2024-07-10 ENCOUNTER — HOME CARE VISIT (OUTPATIENT)
Dept: HOME HEALTH SERVICES | Facility: HOME HEALTH | Age: 74
End: 2024-07-10
Payer: MEDICARE

## 2024-07-10 VITALS
DIASTOLIC BLOOD PRESSURE: 90 MMHG | OXYGEN SATURATION: 98 % | TEMPERATURE: 98 F | HEART RATE: 80 BPM | RESPIRATION RATE: 16 BRPM | SYSTOLIC BLOOD PRESSURE: 154 MMHG

## 2024-07-10 PROCEDURE — G0151 HHCP-SERV OF PT,EA 15 MIN: HCPCS | Mod: HHH

## 2024-07-10 PROCEDURE — 169592 NO-PAY CLAIM PROCEDURE

## 2024-07-10 SDOH — HEALTH STABILITY: PHYSICAL HEALTH
EXERCISE COMMENTS: INITIATED TKA PROTOCOL EX AND STRETCHES.   MARCH OPERATED LEG WITH WALKER AMBULATION FOR GRAVITY ASSISTED KNEE FLEXION AND PROMOTE HIP FLEXOR STRENGTH.  STEP LUNGE STRETCH INITIATED

## 2024-07-10 SDOH — ECONOMIC STABILITY: HOUSING INSECURITY
HOME SAFETY: UH BOOKLET REVIEWED. CONSENT SIGNED. EEP REVIEWED.PT SHOWN CODE TO SCAN FOR HOME CARE NATIONAL PATIENT SAFETY GOALS ON FRONT OF UHHC FOLDER.

## 2024-07-10 ASSESSMENT — ENCOUNTER SYMPTOMS
MUSCLE WEAKNESS: 1
LIMITED RANGE OF MOTION: 1
PAIN LOCATION: LEFT KNEE
PAIN LOCATION - PAIN SEVERITY: 2/10
PAIN SEVERITY GOAL: 2/10
LOWEST PAIN SEVERITY IN PAST 24 HOURS: 6/10
PERSON REPORTING PAIN: PATIENT
HIGHEST PAIN SEVERITY IN PAST 24 HOURS: 10/10
SUBJECTIVE PAIN PROGRESSION: WAXING AND WANING
PAIN: 1

## 2024-07-10 ASSESSMENT — ACTIVITIES OF DAILY LIVING (ADL)
ENTERING_EXITING_HOME: MINIMUM ASSIST
AMBULATION ASSISTANCE ON FLAT SURFACES: 1
AMBULATION ASSISTANCE: 1
CURRENT_FUNCTION: MINIMUM ASSIST
AMBULATION ASSISTANCE: STAND BY ASSIST
OASIS_M1830: 05
PHYSICAL TRANSFERS ASSESSED: 1

## 2024-07-10 NOTE — SIGNIFICANT EVENT
Thank you for taking my call today regarding your recent joint replacement surgery with Dr. Mynor Angelo.      We discussed that: Home Health Care services (physical and/or occupational therapy) have been initiated Your pain is Controlled on the current regimen Will fluctuate throughout recovery with increased activity You are able to tolerate regular activity and exercises The importance of continued cold therapy throughout recovery The importance of following the prescribed precautions by your surgeon The importance of continuing blood thinner as prescribed The importance of wearing compression stockings as prescribed; Patient has an ostomy with adequate soft output    You indicated that all of your questions have been answered at the time of our call.    Please don't hesitate to reach out if you have any additional questions or concerns.    Sravani Tavarez MBA, BSN, RN-BC  ALEXSANDRA Rapp, RN  [facility]  [facility] [phone]

## 2024-07-11 ENCOUNTER — HOME CARE VISIT (OUTPATIENT)
Dept: HOME HEALTH SERVICES | Facility: HOME HEALTH | Age: 74
End: 2024-07-11
Payer: MEDICARE

## 2024-07-13 ENCOUNTER — HOME CARE VISIT (OUTPATIENT)
Dept: HOME HEALTH SERVICES | Facility: HOME HEALTH | Age: 74
End: 2024-07-13
Payer: MEDICARE

## 2024-07-13 PROCEDURE — G0151 HHCP-SERV OF PT,EA 15 MIN: HCPCS | Mod: HHH

## 2024-07-13 SDOH — HEALTH STABILITY: PHYSICAL HEALTH
EXERCISE COMMENTS: PT HAS HO FROM HOSPITAL  SUPINE ANKLE PUMPS GS X 30, QS LLE 3 X 10, SAQ LLE 3 X 10, SLR WITH QS 3 X 10   HEEL SLIDE WITH BELT VCS TO PUSH INTO PAIN AND HOLD FOR 5 SECS X 10 WORKING TOWARDS 3 X 10  SITTING LAQ LLE X 10 WORKING TOWARDS 3 X 10  ACTIVE K

## 2024-07-13 SDOH — HEALTH STABILITY: PHYSICAL HEALTH
EXERCISE COMMENTS: NEE FLEX X 10 WITH 5 SEC HOLD  LLE LUNGES ON FIRST STEP WITH HEEL MAINTAINED ON STEP 5 TO 10 SEC HOLD X 5  ICE TO FOLLOW EXS

## 2024-07-13 ASSESSMENT — ENCOUNTER SYMPTOMS
MUSCLE WEAKNESS: 1
PAIN: 1
LOWEST PAIN SEVERITY IN PAST 24 HOURS: 3/10
PAIN LOCATION - PAIN FREQUENCY: CONSTANT
PAIN LOCATION: LEFT KNEE
PERSON REPORTING PAIN: PATIENT
PAIN LOCATION - EXACERBATING FACTORS: SP TKR
LIMITED RANGE OF MOTION: 1
SUBJECTIVE PAIN PROGRESSION: GRADUALLY IMPROVING
PAIN LOCATION - RELIEVING FACTORS: MEDS ICE
PAIN SEVERITY GOAL: 0/10
HIGHEST PAIN SEVERITY IN PAST 24 HOURS: 5/10

## 2024-07-13 ASSESSMENT — PAIN SCALES - PAIN ASSESSMENT IN ADVANCED DEMENTIA (PAINAD)
BODYLANGUAGE: 0
NEGVOCALIZATION: 0 - NONE.
BREATHING: 0
FACIALEXPRESSION: 0 - SMILING OR INEXPRESSIVE.
TOTALSCORE: 0
FACIALEXPRESSION: 0
CONSOLABILITY: 0 - NO NEED TO CONSOLE.
BODYLANGUAGE: 0 - RELAXED.
NEGVOCALIZATION: 0
CONSOLABILITY: 0

## 2024-07-13 ASSESSMENT — ACTIVITIES OF DAILY LIVING (ADL)
AMBULATION ASSISTANCE ON FLAT SURFACES: 1
AMBULATION_DISTANCE/DURATION_TOLERATED: 100 FEET X 2

## 2024-07-16 ENCOUNTER — HOME CARE VISIT (OUTPATIENT)
Dept: HOME HEALTH SERVICES | Facility: HOME HEALTH | Age: 74
End: 2024-07-16
Payer: MEDICARE

## 2024-07-16 PROCEDURE — G0151 HHCP-SERV OF PT,EA 15 MIN: HCPCS | Mod: HHH

## 2024-07-16 ASSESSMENT — ENCOUNTER SYMPTOMS
HIGHEST PAIN SEVERITY IN PAST 24 HOURS: 5/10
LOWEST PAIN SEVERITY IN PAST 24 HOURS: 0/10
PAIN LOCATION - PAIN FREQUENCY: INTERMITTENT
PAIN LOCATION - PAIN QUALITY: ACHE
PAIN LOCATION: LEFT KNEE
SUBJECTIVE PAIN PROGRESSION: GRADUALLY IMPROVING
PAIN: 1
PAIN LOCATION - PAIN SEVERITY: 1/10
PERSON REPORTING PAIN: PATIENT

## 2024-07-18 ENCOUNTER — HOME CARE VISIT (OUTPATIENT)
Dept: HOME HEALTH SERVICES | Facility: HOME HEALTH | Age: 74
End: 2024-07-18
Payer: MEDICARE

## 2024-07-18 PROCEDURE — G0151 HHCP-SERV OF PT,EA 15 MIN: HCPCS | Mod: HHH

## 2024-07-18 ASSESSMENT — ENCOUNTER SYMPTOMS
PAIN LOCATION - PAIN FREQUENCY: INTERMITTENT
LOWEST PAIN SEVERITY IN PAST 24 HOURS: 0/10
PERSON REPORTING PAIN: PATIENT
SUBJECTIVE PAIN PROGRESSION: GRADUALLY IMPROVING
HIGHEST PAIN SEVERITY IN PAST 24 HOURS: 8/10
PAIN LOCATION - PAIN SEVERITY: 0/10
PAIN LOCATION: LEFT KNEE
PAIN LOCATION - PAIN QUALITY: ACHE
PAIN: 1

## 2024-07-23 ENCOUNTER — HOME CARE VISIT (OUTPATIENT)
Dept: HOME HEALTH SERVICES | Facility: HOME HEALTH | Age: 74
End: 2024-07-23
Payer: MEDICARE

## 2024-07-23 PROCEDURE — G0151 HHCP-SERV OF PT,EA 15 MIN: HCPCS | Mod: HHH

## 2024-07-23 ASSESSMENT — ENCOUNTER SYMPTOMS
PAIN LOCATION - PAIN QUALITY: ACHE
PAIN LOCATION - PAIN SEVERITY: 1/10
PAIN: 1
PAIN LOCATION: LEFT KNEE
PAIN LOCATION - PAIN FREQUENCY: INTERMITTENT
PERSON REPORTING PAIN: PATIENT

## 2024-07-25 ENCOUNTER — HOME CARE VISIT (OUTPATIENT)
Dept: HOME HEALTH SERVICES | Facility: HOME HEALTH | Age: 74
End: 2024-07-25
Payer: MEDICARE

## 2024-07-25 VITALS
HEART RATE: 73 BPM | OXYGEN SATURATION: 97 % | TEMPERATURE: 98.3 F | RESPIRATION RATE: 18 BRPM | DIASTOLIC BLOOD PRESSURE: 60 MMHG | SYSTOLIC BLOOD PRESSURE: 110 MMHG

## 2024-07-25 PROCEDURE — G0151 HHCP-SERV OF PT,EA 15 MIN: HCPCS | Mod: HHH

## 2024-07-25 SDOH — HEALTH STABILITY: PHYSICAL HEALTH
EXERCISE COMMENTS: I IN 30 REPS SUPINE AP, QS, GS, 20 REPS HS, SAQ, SLR, HIP ABD, SEATED LAQ, KNEE FLEXION, STD HEEL/TOE RAISES, MARCHING, KNEE FLEXION, MINISQUATS, SIDESTEPPING ALONG COUNTER.

## 2024-07-25 SDOH — HEALTH STABILITY: PHYSICAL HEALTH: EXERCISE TYPE: TKA

## 2024-07-25 ASSESSMENT — ACTIVITIES OF DAILY LIVING (ADL)
OASIS_M1830: 01
AMBULATION ASSISTANCE ON UNEVEN SURFACES: 1
AMBULATION ASSISTANCE: INDEPENDENT
AMBULATION ASSISTANCE ON FLAT SURFACES: 1
AMBULATION ASSISTANCE: 1
PHYSICAL TRANSFERS ASSESSED: 1
HOME_HEALTH_OASIS: 00
CURRENT_FUNCTION: INDEPENDENT

## 2024-07-25 ASSESSMENT — ENCOUNTER SYMPTOMS
OCCASIONAL FEELINGS OF UNSTEADINESS: 0
HIGHEST PAIN SEVERITY IN PAST 24 HOURS: 3/10
LOWEST PAIN SEVERITY IN PAST 24 HOURS: 0/10
PAIN: 1
SUBJECTIVE PAIN PROGRESSION: GRADUALLY IMPROVING
PAIN LOCATION - PAIN QUALITY: ACHE
PERSON REPORTING PAIN: PATIENT
PAIN LOCATION - PAIN SEVERITY: 3/10
PAIN LOCATION: LEFT KNEE

## 2024-07-31 ENCOUNTER — APPOINTMENT (OUTPATIENT)
Dept: OBSTETRICS AND GYNECOLOGY | Facility: CLINIC | Age: 74
End: 2024-07-31
Payer: MEDICARE

## 2024-08-01 ENCOUNTER — APPOINTMENT (OUTPATIENT)
Dept: OBSTETRICS AND GYNECOLOGY | Facility: CLINIC | Age: 74
End: 2024-08-01
Payer: MEDICARE

## 2024-08-29 ENCOUNTER — APPOINTMENT (OUTPATIENT)
Dept: OBSTETRICS AND GYNECOLOGY | Facility: CLINIC | Age: 74
End: 2024-08-29
Payer: MEDICARE

## 2024-08-29 VITALS
WEIGHT: 143 LBS | BODY MASS INDEX: 28.83 KG/M2 | HEIGHT: 59 IN | SYSTOLIC BLOOD PRESSURE: 112 MMHG | DIASTOLIC BLOOD PRESSURE: 64 MMHG

## 2024-08-29 DIAGNOSIS — Z01.419 WELL WOMAN EXAM WITH ROUTINE GYNECOLOGICAL EXAM: Primary | ICD-10-CM

## 2024-08-29 DIAGNOSIS — M85.852 OSTEOPENIA OF LEFT FEMORAL NECK: ICD-10-CM

## 2024-08-29 DIAGNOSIS — Z12.31 ENCOUNTER FOR SCREENING MAMMOGRAM FOR MALIGNANT NEOPLASM OF BREAST: ICD-10-CM

## 2024-08-29 PROCEDURE — 3074F SYST BP LT 130 MM HG: CPT | Performed by: OBSTETRICS & GYNECOLOGY

## 2024-08-29 PROCEDURE — 1036F TOBACCO NON-USER: CPT | Performed by: OBSTETRICS & GYNECOLOGY

## 2024-08-29 PROCEDURE — 1160F RVW MEDS BY RX/DR IN RCRD: CPT | Performed by: OBSTETRICS & GYNECOLOGY

## 2024-08-29 PROCEDURE — 1157F ADVNC CARE PLAN IN RCRD: CPT | Performed by: OBSTETRICS & GYNECOLOGY

## 2024-08-29 PROCEDURE — 3078F DIAST BP <80 MM HG: CPT | Performed by: OBSTETRICS & GYNECOLOGY

## 2024-08-29 PROCEDURE — G0101 CA SCREEN;PELVIC/BREAST EXAM: HCPCS | Performed by: OBSTETRICS & GYNECOLOGY

## 2024-08-29 PROCEDURE — 1159F MED LIST DOCD IN RCRD: CPT | Performed by: OBSTETRICS & GYNECOLOGY

## 2024-08-29 RX ORDER — PREDNISONE 20 MG/1
TABLET ORAL
COMMUNITY
Start: 2024-08-12

## 2024-08-29 NOTE — PROGRESS NOTES
"Subjective   Carmella Shin is a 74 y.o. female who is here for a routine well woman exam.     PAP 11/04/15 NEG  MAMMO 7/27/22 NEG  DEXA 06/25/21, Osteopenia  COLON No need--No colon     Last here 2 years ago.  No complaints today.  Knee replacement recently, no GYN issues.     Denies vaginal bleeding.  No pelvic pain. H/o BSO     No breast changes.      Osteopenia. Last T score -1.6  Using calcium and Vitamin D    No bowel changes. She has a colostomy due to colectomy for rectal cancer.    ROS:  Constitutional: Negative  Eyes: Negative  HEENT: Negative  Respiratory: Negative  Cardiovascular: Negative  GI: Negative  : Negative  Endocrine: Negative  Breast: Negative  Musculoskeletal: Negative  Skin: Negative  Hematologic: Negative  Neurologic: Negative  Psychiatric: + anxiety and depression    Objective   /64   Ht 1.499 m (4' 11\")   Wt 64.9 kg (143 lb)   BMI 28.88 kg/m²     Constitutional: Alert and in no acute distress.     Pulmonary: No respiratory distress.     Chest: Breasts: Normal appearance, no nipple discharge, and no skin changes. Palpation of breasts and axillae: No palpable mass and no axillary lymphadenopathy.    Abdomen: Soft, nontender; no abdominal mass palpated.     Genitourinary:   External genitalia: Normal appearance.  No inguinal lymphadenopathy.   Bartholin's, Urethral, and Skenes Glands: Normal.   Urethra: Normal. Bladder: Normal on palpation.   Vagina: thin, atrophic vaginal tissue   Cervix: Normal appearance, nontender.   Uterus/Adnexa: Normal size, mobile uterus. Nontender, no masses palpated in adnexa  Inspection of perianal area: Normal.    Musculoskeletal: No joint swelling seen, normal movements of all extremities.    Skin: Normal skin color and pigmentation, normal skin turgor, and no rash.    Psychiatric: Alert and oriented x 3. Affect normal to patient's baseline. Mood: Appropriate.       Assessment/Plan     Breast and pelvic exam done today.  Pap testing is no longer " indicated.   She was given an order for a screening mammogram and will schedule this.  She should continue Vitamin D, calcium for bone health. Recommend bone density testing to follow osteopenia. Order for this placed.  Advised her to call with any new problems or questions.

## 2024-09-13 ENCOUNTER — HOSPITAL ENCOUNTER (OUTPATIENT)
Dept: RADIOLOGY | Facility: HOSPITAL | Age: 74
Discharge: HOME | End: 2024-09-13
Payer: MEDICARE

## 2024-09-13 VITALS — HEIGHT: 59 IN | WEIGHT: 147 LBS | BODY MASS INDEX: 29.64 KG/M2

## 2024-09-13 DIAGNOSIS — Z12.31 ENCOUNTER FOR SCREENING MAMMOGRAM FOR MALIGNANT NEOPLASM OF BREAST: ICD-10-CM

## 2024-09-13 DIAGNOSIS — M85.852 OSTEOPENIA OF LEFT FEMORAL NECK: ICD-10-CM

## 2024-09-13 PROCEDURE — 77063 BREAST TOMOSYNTHESIS BI: CPT | Performed by: RADIOLOGY

## 2024-09-13 PROCEDURE — 77067 SCR MAMMO BI INCL CAD: CPT | Performed by: RADIOLOGY

## 2024-09-13 PROCEDURE — 77067 SCR MAMMO BI INCL CAD: CPT

## 2024-09-13 PROCEDURE — 77080 DXA BONE DENSITY AXIAL: CPT

## 2025-02-12 ENCOUNTER — APPOINTMENT (OUTPATIENT)
Dept: CARDIOLOGY | Facility: CLINIC | Age: 75
End: 2025-02-12
Payer: MEDICARE

## 2025-04-21 DIAGNOSIS — E78.00 PURE HYPERCHOLESTEROLEMIA: ICD-10-CM

## 2025-04-21 NOTE — TELEPHONE ENCOUNTER
Please send the attached prescription to the patient's pharmacy as soon as possible. Thank you!    Requested Prescriptions     Pending Prescriptions Disp Refills    rosuvastatin (Crestor) 10 mg tablet [Pharmacy Med Name: ROSUVASTATIN CALCIUM 10 MG TAB] 90 tablet 3     Sig: TAKE 1 TABLET BY MOUTH EVERY DAY

## 2025-04-22 RX ORDER — ROSUVASTATIN CALCIUM 10 MG/1
10 TABLET, COATED ORAL DAILY
Qty: 90 TABLET | Refills: 3 | Status: SHIPPED | OUTPATIENT
Start: 2025-04-22

## 2025-07-26 ENCOUNTER — OFFICE VISIT (OUTPATIENT)
Dept: URGENT CARE | Age: 75
End: 2025-07-26
Payer: MEDICARE

## 2025-07-26 DIAGNOSIS — J06.9 UPPER RESPIRATORY TRACT INFECTION, UNSPECIFIED TYPE: ICD-10-CM

## 2025-07-26 DIAGNOSIS — J04.0 LARYNGITIS: Primary | ICD-10-CM

## 2025-07-26 DIAGNOSIS — R05.1 ACUTE COUGH: ICD-10-CM

## 2025-07-26 RX ORDER — BENZONATATE 200 MG/1
200 CAPSULE ORAL 3 TIMES DAILY PRN
Qty: 30 CAPSULE | Refills: 0 | Status: SHIPPED | OUTPATIENT
Start: 2025-07-26 | End: 2025-08-05

## 2025-07-26 RX ORDER — METHYLPREDNISOLONE 4 MG/1
TABLET ORAL
Qty: 21 TABLET | Refills: 0 | Status: SHIPPED | OUTPATIENT
Start: 2025-07-26

## 2025-07-26 RX ORDER — AZITHROMYCIN 250 MG/1
TABLET, FILM COATED ORAL
Qty: 6 TABLET | Refills: 0 | Status: SHIPPED | OUTPATIENT
Start: 2025-07-26 | End: 2025-07-31

## 2025-07-26 NOTE — PROGRESS NOTES
Subjective   Patient ID: Carmella Shin is a 75 y.o. female. They present today with a chief complaint of No chief complaint on file..    History of Present Illness  I have communicated my name and active licensure. Video visit completed with realtime synchronous video/audio connection. Informed consent was obtained from the patient. Patient was made aware that my evaluation and diagnosis are limited due to the fact that we are not in the same room during the interview and that this is a virtual encounter that took place via videoconferencing. Patient verbalized understanding.     Patient c/o laryngitis with a cough.  States she has been taking Advil and Tylenol with  no relief.  She is unsure of a feer but did have chills last night.  Denies runny nose and congestion.    Past Medical History  Allergies as of 07/26/2025 - Reviewed 07/26/2025   Allergen Reaction Noted    Vancomycin Hives 02/08/2023    Ace inhibitors Cough 02/08/2023    Bupropion Nausea Only and Tinnitus 02/08/2023    Escitalopram oxalate Swelling 02/08/2023    Metronidazole Itching 02/08/2023    Penicillins Itching 02/08/2023    Sulfa (sulfonamide antibiotics) Nausea Only 02/08/2023    Venlafaxine Diarrhea 02/08/2023       Prescriptions Prior to Admission[1]     Medical History[2]    Surgical History[3]     reports that she quit smoking about 51 years ago. Her smoking use included cigarettes. She started smoking about 56 years ago. She has a 6.3 pack-year smoking history. She has been exposed to tobacco smoke. She has never used smokeless tobacco. She reports that she does not drink alcohol and does not use drugs.    Review of Systems  Review of Systems     See HPI                          Objective    There were no vitals filed for this visit.  No LMP recorded. Patient is postmenopausal.    Physical Exam  Constitutional:       Appearance: Normal appearance.     Neurological:      Mental Status: She is alert.     Psychiatric:         Behavior:  Behavior is cooperative.         Procedures    Point of Care Test & Imaging Results from this visit  No results found for this visit on 07/26/25.   Imaging  No results found.    Cardiology, Vascular, and Other Imaging  No other imaging results found for the past 2 days      Diagnostic study results (if any) were reviewed by AMAYA Boyer.    Assessment/Plan   Allergies, medications, history, and pertinent labs/EKGs/Imaging reviewed by AMAYA Boyer.     Medical Decision Making  At time of discharge patient was clinically well-appearing and HDS for outpatient management. The patient and/or family was educated regarding diagnosis, supportive care, OTC and Rx medications. The patient and/or family was given the opportunity to ask questions prior to discharge.  They verbalized understanding of my discussion of the plans for treatment, expected course, indications to return to  or seek further evaluation in ED, and the need for timely follow up as directed.   They were provided with a work/school excuse if requested.      Orders and Diagnoses  Diagnoses and all orders for this visit:  Laryngitis  -     benzonatate (Tessalon) 200 mg capsule; Take 1 capsule (200 mg) by mouth 3 times a day as needed for cough for up to 10 days. Do not crush or chew.  -     methylPREDNISolone (Medrol Dospak) 4 mg tablets; Follow schedule on package instructions  -     azithromycin (Zithromax) 250 mg tablet; Take 2 tablets (500 mg) by mouth once daily for 1 day, THEN 1 tablet (250 mg) once daily for 4 days. Take 2 tabs (500 mg) by mouth today, then take 1 tab daily for 4 days.  Acute cough  -     benzonatate (Tessalon) 200 mg capsule; Take 1 capsule (200 mg) by mouth 3 times a day as needed for cough for up to 10 days. Do not crush or chew.  -     methylPREDNISolone (Medrol Dospak) 4 mg tablets; Follow schedule on package instructions  -     azithromycin (Zithromax) 250 mg tablet; Take 2 tablets (500 mg) by mouth once  daily for 1 day, THEN 1 tablet (250 mg) once daily for 4 days. Take 2 tabs (500 mg) by mouth today, then take 1 tab daily for 4 days.  Upper respiratory tract infection, unspecified type      Medical Admin Record      Patient disposition: Home    Electronically signed by AMAYA Boyer  9:53 AM           [1] (Not in a hospital admission)   [2]   Past Medical History:  Diagnosis Date    Anxiety     Benign neoplasm of meninges, unspecified     Incidental note is made of a right middle cranial fossa meningioma measuring approximately 5 mm, per sinus CT     Chronic rhinitis     GERD (gastroesophageal reflux disease)     HLD (hyperlipidemia)     Hypertension     Osteopenia     Pancreatic cyst ([facility])     Imaging features favor that of a serous cystic neoplasm, EUS was non diagnostic    Rectal cancer (Multi)     Pathology: Tubular adenoma with intramucosal adenocarcinoma    Ulcerative colitis     S/P colectomy in the    [3]   Past Surgical History:  Procedure Laterality Date    CARDIAC CATHETERIZATION  2016    Impression: No significant angiographic coronary artery disease.    CATARACT EXTRACTION       SECTION, CLASSIC      x2    COLECTOMY PARTIAL / TOTAL      w/ ileal rectal anastomosis    COLON SURGERY  2009    Abdominoperineal resection of rectal tumor with ileostomy, and BSO    ERCP  2019    For pancreatic cyst, nondiagnostic    FLEXIBLE SIGMOIDOSCOPY      Multiple for UC    FOOT SURGERY Right 2015    Arthrodesis 1st, 2nd, and 3rd tarsometatarsal joint, right foot.    ILEOSTOMY  2009    INDUCED       TONSILLECTOMY      TOTAL KNEE ARTHROPLASTY Left 2024

## 2025-09-02 ENCOUNTER — APPOINTMENT (OUTPATIENT)
Dept: AUDIOLOGY | Facility: CLINIC | Age: 75
End: 2025-09-02

## 2025-09-02 ENCOUNTER — APPOINTMENT (OUTPATIENT)
Dept: AUDIOLOGY | Facility: CLINIC | Age: 75
End: 2025-09-02
Payer: MEDICARE

## 2025-09-17 ENCOUNTER — APPOINTMENT (OUTPATIENT)
Dept: RADIOLOGY | Facility: HOSPITAL | Age: 75
End: 2025-09-17
Payer: MEDICARE

## (undated) DEVICE — SOLUTION, POVIDONE IODINE 10%, 0.75 OZ, NS

## (undated) DEVICE — BLANKET, LOWER BODY, VHA PLUS, ADULT

## (undated) DEVICE — PROTECTIVE, FOOT, FOAM PAD & COHESIVE WRAP, STERILE

## (undated) DEVICE — WATER STERILE, FOR IRRIGATION, 1000ML, W/HANGER

## (undated) DEVICE — DRESSING, ABDOMINAL, TENDERSORB, 8 X 7-1/2 IN, STERILE

## (undated) DEVICE — 4MM X 110MM MAKO BONE PINS

## (undated) DEVICE — DRESSING, MEPILEX FOAM BORDER AG, SILVER, 4 X 4

## (undated) DEVICE — PIN, BONE, 4MM X 140MM, STERILE

## (undated) DEVICE — Device

## (undated) DEVICE — BLADE, MAKO, SAGITTAL, NARROW

## (undated) DEVICE — SUTURE, CTD, VICRYL, 2-0, UND, BR, CT-2

## (undated) DEVICE — DRAPE, IRRIGATION, W/POUCH, ADHESIVE STRIP, STERI DRAPE, 19 X 23 IN, DISPOSABLE, STERILE

## (undated) DEVICE — SUTURE, VICRYL, 1, 36 IN, CT-1, UNDYED

## (undated) DEVICE — CUFF, TOURNIQUET, 30 X 4, DUAL PORT/SNGL BLADDER, DISP, LF

## (undated) DEVICE — BLADE, SAGITTAL DUAL CUT, 25 X 90 X 1.27

## (undated) DEVICE — NEEDLE, SPINAL, 22 G X 3.5 IN, BLACK HUB

## (undated) DEVICE — SYRINGE, 50 CC, LUER LOCK

## (undated) DEVICE — CHECKPOINT KIT, FEMORAL/ TIBIAL

## (undated) DEVICE — GOWNS, SURGICAL, NONREINFORCED, W/ RAGLAN SLEEVE, SZ XL

## (undated) DEVICE — BANDAGE, COFLEX, 6 X 5 YDS, TAN, STERILE, LF

## (undated) DEVICE — SOLUTION, IRRIGATION, X RX SODIUM CHL 0.9%, 500ML BTL

## (undated) DEVICE — TRACKING KIT, TM KNEE PROCEDURES, VIZADISC

## (undated) DEVICE — TRAY, DRY PREP, PREMIUM